# Patient Record
Sex: MALE | NOT HISPANIC OR LATINO | Employment: UNEMPLOYED | URBAN - NONMETROPOLITAN AREA
[De-identification: names, ages, dates, MRNs, and addresses within clinical notes are randomized per-mention and may not be internally consistent; named-entity substitution may affect disease eponyms.]

---

## 2023-09-30 ENCOUNTER — HOSPITAL ENCOUNTER (INPATIENT)
Facility: HOSPITAL | Age: 23
LOS: 6 days | Discharge: HOME | DRG: 897 | End: 2023-10-06
Attending: EMERGENCY MEDICINE | Admitting: PSYCHIATRY & NEUROLOGY
Payer: MEDICAID

## 2023-09-30 DIAGNOSIS — F10.920 ALCOHOLIC INTOXICATION WITHOUT COMPLICATION: ICD-10-CM

## 2023-09-30 DIAGNOSIS — Z00.8 MEDICAL CLEARANCE FOR PSYCHIATRIC ADMISSION: Primary | ICD-10-CM

## 2023-09-30 DIAGNOSIS — F10.220 ALCOHOL DEPENDENCE WITH UNCOMPLICATED INTOXICATION: ICD-10-CM

## 2023-09-30 LAB
ALBUMIN SERPL BCP-MCNC: 4.7 G/DL (ref 3.5–5.2)
ALP SERPL-CCNC: 80 U/L (ref 55–135)
ALT SERPL W/O P-5'-P-CCNC: 41 U/L (ref 10–44)
AMPHET+METHAMPHET UR QL: NEGATIVE
ANION GAP SERPL CALC-SCNC: 5 MMOL/L (ref 3–11)
APAP SERPL-MCNC: <2 UG/ML (ref 10–20)
AST SERPL-CCNC: 75 U/L (ref 10–40)
BARBITURATES UR QL SCN>200 NG/ML: NEGATIVE
BASOPHILS # BLD AUTO: 0.05 K/UL (ref 0–0.2)
BASOPHILS NFR BLD: 0.7 % (ref 0–1.9)
BENZODIAZ UR QL SCN>200 NG/ML: NEGATIVE
BILIRUB SERPL-MCNC: 0.7 MG/DL (ref 0.1–1)
BUN SERPL-MCNC: 7 MG/DL (ref 6–20)
BZE UR QL SCN: NEGATIVE
CALCIUM SERPL-MCNC: 8.6 MG/DL (ref 8.7–10.5)
CANNABINOIDS UR QL SCN: NEGATIVE
CHLORIDE SERPL-SCNC: 112 MMOL/L (ref 95–110)
CO2 SERPL-SCNC: 28 MMOL/L (ref 23–29)
CREAT SERPL-MCNC: 0.9 MG/DL (ref 0.5–1.4)
CREAT UR-MCNC: 33.8 MG/DL (ref 23–375)
DIFFERENTIAL METHOD: NORMAL
EOSINOPHIL # BLD AUTO: 0.2 K/UL (ref 0–0.5)
EOSINOPHIL NFR BLD: 2.1 % (ref 0–8)
ERYTHROCYTE [DISTWIDTH] IN BLOOD BY AUTOMATED COUNT: 12 % (ref 11.5–14.5)
EST. GFR  (NO RACE VARIABLE): >60 ML/MIN/1.73 M^2
ETHANOL SERPL-MCNC: 177 MG/DL
ETHANOL SERPL-MCNC: 267 MG/DL
ETHANOL SERPL-MCNC: 343 MG/DL
GLUCOSE SERPL-MCNC: 130 MG/DL (ref 70–110)
HCT VFR BLD AUTO: 46.6 % (ref 40–54)
HGB BLD-MCNC: 16 G/DL (ref 14–18)
IMM GRANULOCYTES # BLD AUTO: 0.02 K/UL (ref 0–0.04)
IMM GRANULOCYTES NFR BLD AUTO: 0.3 % (ref 0–0.5)
LYMPHOCYTES # BLD AUTO: 2 K/UL (ref 1–4.8)
LYMPHOCYTES NFR BLD: 27.7 % (ref 18–48)
MAGNESIUM SERPL-MCNC: 2.3 MG/DL (ref 1.6–2.6)
MCH RBC QN AUTO: 30.6 PG (ref 27–31)
MCHC RBC AUTO-ENTMCNC: 34.3 G/DL (ref 32–36)
MCV RBC AUTO: 89 FL (ref 82–98)
METHADONE UR QL SCN>300 NG/ML: NEGATIVE
MONOCYTES # BLD AUTO: 0.7 K/UL (ref 0.3–1)
MONOCYTES NFR BLD: 9.5 % (ref 4–15)
NEUTROPHILS # BLD AUTO: 4.2 K/UL (ref 1.8–7.7)
NEUTROPHILS NFR BLD: 59.7 % (ref 38–73)
NRBC BLD-RTO: 0 /100 WBC
OPIATES UR QL SCN: NEGATIVE
PCP UR QL SCN>25 NG/ML: NEGATIVE
PLATELET # BLD AUTO: 242 K/UL (ref 150–450)
PMV BLD AUTO: 9.2 FL (ref 9.2–12.9)
POTASSIUM SERPL-SCNC: 3.8 MMOL/L (ref 3.5–5.1)
PROT SERPL-MCNC: 8.3 G/DL (ref 6–8.4)
RBC # BLD AUTO: 5.23 M/UL (ref 4.6–6.2)
SALICYLATES SERPL-MCNC: <1.7 MG/DL (ref 15–30)
SODIUM SERPL-SCNC: 145 MMOL/L (ref 136–145)
TOXICOLOGY INFORMATION: NORMAL
TSH SERPL DL<=0.005 MIU/L-ACNC: 1.42 UIU/ML (ref 0.4–4)
WBC # BLD AUTO: 7.05 K/UL (ref 3.9–12.7)

## 2023-09-30 PROCEDURE — 80143 DRUG ASSAY ACETAMINOPHEN: CPT | Performed by: EMERGENCY MEDICINE

## 2023-09-30 PROCEDURE — 99285 EMERGENCY DEPT VISIT HI MDM: CPT

## 2023-09-30 PROCEDURE — 84443 ASSAY THYROID STIM HORMONE: CPT | Performed by: EMERGENCY MEDICINE

## 2023-09-30 PROCEDURE — 25000003 PHARM REV CODE 250: Performed by: EMERGENCY MEDICINE

## 2023-09-30 PROCEDURE — 80053 COMPREHEN METABOLIC PANEL: CPT | Performed by: EMERGENCY MEDICINE

## 2023-09-30 PROCEDURE — 11400000 HC PSYCH PRIVATE ROOM

## 2023-09-30 PROCEDURE — 80179 DRUG ASSAY SALICYLATE: CPT | Performed by: EMERGENCY MEDICINE

## 2023-09-30 PROCEDURE — 25000003 PHARM REV CODE 250: Performed by: PSYCHIATRY & NEUROLOGY

## 2023-09-30 PROCEDURE — 36415 COLL VENOUS BLD VENIPUNCTURE: CPT | Performed by: EMERGENCY MEDICINE

## 2023-09-30 PROCEDURE — 83735 ASSAY OF MAGNESIUM: CPT | Performed by: EMERGENCY MEDICINE

## 2023-09-30 PROCEDURE — 80307 DRUG TEST PRSMV CHEM ANLYZR: CPT | Performed by: EMERGENCY MEDICINE

## 2023-09-30 PROCEDURE — 82077 ASSAY SPEC XCP UR&BREATH IA: CPT | Mod: 91 | Performed by: EMERGENCY MEDICINE

## 2023-09-30 PROCEDURE — 85025 COMPLETE CBC W/AUTO DIFF WBC: CPT | Performed by: EMERGENCY MEDICINE

## 2023-09-30 RX ORDER — HYDROXYZINE PAMOATE 50 MG/1
50 CAPSULE ORAL EVERY 6 HOURS PRN
Status: DISCONTINUED | OUTPATIENT
Start: 2023-09-30 | End: 2023-09-30

## 2023-09-30 RX ORDER — IBUPROFEN 200 MG
1 TABLET ORAL DAILY PRN
Status: DISCONTINUED | OUTPATIENT
Start: 2023-09-30 | End: 2023-10-06 | Stop reason: HOSPADM

## 2023-09-30 RX ORDER — ACETAMINOPHEN 325 MG/1
650 TABLET ORAL EVERY 6 HOURS PRN
Status: DISCONTINUED | OUTPATIENT
Start: 2023-09-30 | End: 2023-10-06 | Stop reason: HOSPADM

## 2023-09-30 RX ORDER — OLANZAPINE 10 MG/2ML
10 INJECTION, POWDER, FOR SOLUTION INTRAMUSCULAR EVERY 8 HOURS PRN
Status: DISCONTINUED | OUTPATIENT
Start: 2023-09-30 | End: 2023-10-06 | Stop reason: HOSPADM

## 2023-09-30 RX ORDER — MAG HYDROX/ALUMINUM HYD/SIMETH 200-200-20
30 SUSPENSION, ORAL (FINAL DOSE FORM) ORAL EVERY 6 HOURS PRN
Status: DISCONTINUED | OUTPATIENT
Start: 2023-09-30 | End: 2023-10-06 | Stop reason: HOSPADM

## 2023-09-30 RX ORDER — HYDROXYZINE PAMOATE 50 MG/1
50 CAPSULE ORAL EVERY 6 HOURS PRN
Status: DISCONTINUED | OUTPATIENT
Start: 2023-09-30 | End: 2023-10-06 | Stop reason: HOSPADM

## 2023-09-30 RX ORDER — LOPERAMIDE HYDROCHLORIDE 2 MG/1
2 CAPSULE ORAL 4 TIMES DAILY PRN
Status: DISCONTINUED | OUTPATIENT
Start: 2023-09-30 | End: 2023-10-06 | Stop reason: HOSPADM

## 2023-09-30 RX ORDER — TALC
6 POWDER (GRAM) TOPICAL NIGHTLY PRN
Status: DISCONTINUED | OUTPATIENT
Start: 2023-09-30 | End: 2023-10-06 | Stop reason: HOSPADM

## 2023-09-30 RX ORDER — IBUPROFEN 600 MG/1
600 TABLET ORAL
Status: COMPLETED | OUTPATIENT
Start: 2023-09-30 | End: 2023-09-30

## 2023-09-30 RX ORDER — DOCUSATE SODIUM 100 MG/1
100 CAPSULE, LIQUID FILLED ORAL DAILY PRN
Status: DISCONTINUED | OUTPATIENT
Start: 2023-09-30 | End: 2023-10-06 | Stop reason: HOSPADM

## 2023-09-30 RX ORDER — SODIUM CHLORIDE 0.9 % (FLUSH) 0.9 %
10 SYRINGE (ML) INJECTION
Status: DISCONTINUED | OUTPATIENT
Start: 2023-09-30 | End: 2023-10-06 | Stop reason: HOSPADM

## 2023-09-30 RX ORDER — OLANZAPINE 10 MG/1
10 TABLET ORAL EVERY 8 HOURS PRN
Status: DISCONTINUED | OUTPATIENT
Start: 2023-09-30 | End: 2023-10-06 | Stop reason: HOSPADM

## 2023-09-30 RX ADMIN — Medication 6 MG: at 08:09

## 2023-09-30 RX ADMIN — HYDROXYZINE PAMOATE 50 MG: 50 CAPSULE ORAL at 08:09

## 2023-09-30 RX ADMIN — IBUPROFEN 600 MG: 600 TABLET ORAL at 04:09

## 2023-09-30 NOTE — PLAN OF CARE
Heron Pop admitted to [facility] under the care of Raffi Cheng MD with diagnosis of substance abuse. [facility] Patient is a 21yo male.    Patient calm, cooperative, quiet, and anxious. Denies Suicidal Ideation, Homicidal Ideation, Auditory Hallucinations, Visual Hallucinations, Tactile Hallucinations, Gustatory Hallucinations, and Delusions. Patient has a history of bipolar and schizophrenia and has not been medication compliant with his psych medications. Patient reports he is an alcoholic and want to go to REHAB. Patient states he drinks 14 to 18 beers a day. Patient was involved in a altercation at his Mother's house and the police were called. [facility] transported the patient to [facility] ED. Patient's ETOH was 343 upon arrival and came down to 177 before coming up to the U. The patient's UDS was negative. Patient is cooperative, but is a little loud at times.      Patient assessed and oriented to room unit and routine. Patient denies pain or discomfort at present and remains injury-free.     BLAKE COLE RN

## 2023-09-30 NOTE — ED PROVIDER NOTES
"Encounter Date: 9/30/2023       History     Chief Complaint   Patient presents with    Alcohol Problem     Patient brought in by [facility]. Patient states "I just want rehab" denies SI/HI     Patient with history of bipolar schizophrenia presents via police after a domestic argument with his mother requesting that he be brought to the emergency department.  The patient admits he is not been taking his medication as want any inpatient help.  He is tearful but not being argumentative at this time.  He also states that he has been drinking alcohol.  Denies any suicidal or homicidal ideation.      Review of patient's allergies indicates:  No Known Allergies  Past Medical History:   Diagnosis Date    ADHD     Bipolar disorder, unspecified     Schizophrenia      History reviewed. No pertinent surgical history.  History reviewed. No pertinent family history.  Social History     Substance Use Topics    Alcohol use: Yes     Review of Systems   Psychiatric/Behavioral:  Positive for agitation, behavioral problems and sleep disturbance. The patient is nervous/anxious.    All other systems reviewed and are negative.      Physical Exam     Initial Vitals [09/30/23 0802]   BP Pulse Resp Temp SpO2   (!) 157/99 (!) 130 20 98.6 °F (37 °C) 97 %      MAP       --         Physical Exam    Nursing note and vitals reviewed.  Constitutional: He appears well-developed and well-nourished.   HENT:   Head: Normocephalic and atraumatic.   Eyes: EOM are normal. Pupils are equal, round, and reactive to light.   Neck:   Normal range of motion.  Cardiovascular:  Normal rate.           Tachycardia   Pulmonary/Chest: Breath sounds normal. No respiratory distress. He has no wheezes. He has no rales.   Abdominal: Abdomen is soft. Bowel sounds are normal. He exhibits no distension. There is no abdominal tenderness. There is no rebound.   Musculoskeletal:         General: Normal range of motion.      Cervical back: Normal range of motion.     Neurological: He " is alert and oriented to person, place, and time.   Psychiatric:   Patient is anxious, tearful         ED Course   Procedures  Labs Reviewed   ALCOHOL,MEDICAL (ETHANOL) - Abnormal; Notable for the following components:       Result Value    Alcohol, Serum 343 (*)     All other components within normal limits    Narrative:     Alcohol critical result(s) called and verbal readback obtained from   Jessa Cameron RN by [facility] 09/30/2023 08:52   COMPREHENSIVE METABOLIC PANEL - Abnormal; Notable for the following components:    Chloride 112 (*)     Glucose 130 (*)     Calcium 8.6 (*)     AST 75 (*)     All other components within normal limits   SALICYLATE LEVEL - Abnormal; Notable for the following components:    Salicylate Lvl <1.7 (*)     All other components within normal limits   ACETAMINOPHEN LEVEL - Abnormal; Notable for the following components:    Acetaminophen (Tylenol), Serum <2.0 (*)     All other components within normal limits   ALCOHOL,MEDICAL (ETHANOL) - Abnormal; Notable for the following components:    Alcohol, Serum 267 (*)     All other components within normal limits   CBC W/ AUTO DIFFERENTIAL   MAGNESIUM   TSH   DRUG SCREEN PANEL, URINE EMERGENCY    Narrative:     Specimen Source->Urine          Imaging Results    None          Medications   sodium chloride 0.9% flush 10 mL (has no administration in time range)   melatonin tablet 6 mg (has no administration in time range)     Medical Decision Making  Amount and/or Complexity of Data Reviewed  Labs: ordered. Decision-making details documented in ED Course.    Risk  OTC drugs.  Prescription drug management.  Decision regarding hospitalization.               ED Course as of 09/30/23 1350   Sat Sep 30, 2023   1317 Alcohol, Serum(!): 267 [DL]   1346 Alcohol, Serum(!): 267 [DL]      ED Course User Index  [DL] Manuel Taylor MD               Medical Decision Making:   Initial Assessment:   Patient resting comfortably appears intoxicated  Differential  Diagnosis:   Medical noncompliance, schizophrenia, bipolar, alcohol abuse, suicidal ideation, homicidal ideation  ED Management:  Nursing staff discussed case with patient's mother.  Patient is supposed to be on multiple medications for his schizophrenia and bipolar has not taken it for many months but the mother is unsure of the medication he is supposed to be on.  She does not think he has been taking any drugs other than alcohol.  He is become combative at times the house however he is not shown any aggression for concerns of homicidal ideation.    Patient has elevated alcohol level will have to wait for transfer otherwise patient medically cleared for transfer.    Time 1:50 p.m.   Patient alcohol level trending down.  Discussed case with Dr. Cheng who will accept the patient once alcohol serum levels were less than 100.      Clinical Impression:   Final diagnoses:  [Z00.8] Medical clearance for psychiatric admission (Primary)  [F10.920] Alcoholic intoxication without complication        ED Disposition Condition    Admit Stable                Manuel Taylor MD  09/30/23 0457       Manuel Taylor MD  09/30/23 9847

## 2023-10-01 PROBLEM — F10.20 ALCOHOL DEPENDENCE: Status: ACTIVE | Noted: 2023-10-01

## 2023-10-01 PROBLEM — R73.03 PREDIABETES: Status: ACTIVE | Noted: 2023-10-01

## 2023-10-01 PROCEDURE — 99223 1ST HOSP IP/OBS HIGH 75: CPT | Mod: ,,, | Performed by: PSYCHIATRY & NEUROLOGY

## 2023-10-01 PROCEDURE — 99222 PR INITIAL HOSPITAL CARE,LEVL II: ICD-10-PCS | Mod: ,,, | Performed by: STUDENT IN AN ORGANIZED HEALTH CARE EDUCATION/TRAINING PROGRAM

## 2023-10-01 PROCEDURE — 99223 PR INITIAL HOSPITAL CARE,LEVL III: ICD-10-PCS | Mod: ,,, | Performed by: PSYCHIATRY & NEUROLOGY

## 2023-10-01 PROCEDURE — 25000003 PHARM REV CODE 250: Performed by: PSYCHIATRY & NEUROLOGY

## 2023-10-01 PROCEDURE — 25000003 PHARM REV CODE 250: Performed by: EMERGENCY MEDICINE

## 2023-10-01 PROCEDURE — 11400000 HC PSYCH PRIVATE ROOM

## 2023-10-01 PROCEDURE — 99222 1ST HOSP IP/OBS MODERATE 55: CPT | Mod: ,,, | Performed by: STUDENT IN AN ORGANIZED HEALTH CARE EDUCATION/TRAINING PROGRAM

## 2023-10-01 RX ORDER — LORAZEPAM 1 MG/1
1 TABLET ORAL 4 TIMES DAILY
Status: DISCONTINUED | OUTPATIENT
Start: 2023-10-01 | End: 2023-10-03

## 2023-10-01 RX ADMIN — LORAZEPAM 1 MG: 1 TABLET ORAL at 05:10

## 2023-10-01 RX ADMIN — LORAZEPAM 1 MG: 1 TABLET ORAL at 01:10

## 2023-10-01 RX ADMIN — Medication 6 MG: at 08:10

## 2023-10-01 RX ADMIN — LORAZEPAM 1 MG: 1 TABLET ORAL at 08:10

## 2023-10-01 RX ADMIN — HYDROXYZINE PAMOATE 50 MG: 50 CAPSULE ORAL at 08:10

## 2023-10-01 NOTE — NURSING
POC reviewed this shift and is ongoing.  Pt calm and cooperative on unit, no scheduled medications ordered.  Pt requested medication to help him sleep, given PRN melatonin and vistaril.  Pt also c/o headache.  Pt declined PRN tylenol and stated that he would try to sleep and notify staff if additional PRN medications are needed.  Pt out in dining area most of the evening, interacting appropriately with staff and peers.

## 2023-10-01 NOTE — H&P
History & Physical      SUBJECTIVE:     History of Present Illness:  Patient is a 22 y.o. male presents with alcohol abuse. Admitted to [facility].    No past medical history other than psych. No complaints today.  Patient endorses he would like to go to facility to detox.  No medications prior to admission.       Review of patient's allergies indicates:  No Known Allergies    Past Medical History:   Diagnosis Date    ADHD     Bipolar disorder, unspecified     Schizophrenia      History reviewed. No pertinent surgical history.  History reviewed. No pertinent family history.  Social History     Tobacco Use    Smoking status: Never    Smokeless tobacco: Never   Substance Use Topics    Alcohol use: Yes        Review of Systems:  Constitutional: no fever or chills  Respiratory: no cough or shorness of breath  Cardiovascular: no chest pain or palpitations  Gastrointestinal: no nausea or vomiting, no abdominal pain or change in bowel habits  Musculoskeletal: no arthralgias or myalgias    OBJECTIVE:     Vital Signs (Most Recent)  Temp: 98.1 °F (36.7 °C) (10/01/23 0736)  Pulse: 77 (10/01/23 0736)  Resp: 20 (10/01/23 0736)  BP: 121/79 (10/01/23 0736)  SpO2: 99 % (10/01/23 0736)    Physical Exam:  General: well developed, well nourished  Lungs:  clear to auscultation bilaterally and normal respiratory effort  Cardiovascular: Heart: regular rate and rhythm, S1, S2 normal, no murmur, click, rub or gallop. Chest Wall: no tenderness. Extremities: no cyanosis or edema, or clubbing. Pulses: 2+ and symmetric.  Abdomen/Rectal: Abdomen: soft, non-tender non-distented; bowel sounds normal; no masses,  no organomegaly. Rectal: not examined  Skin: Skin color, texture, turgor normal. No rashes or lesions  Musculoskeletal:normal gait  Psych:   Neuro: Cranial nerves:  CN II Visual fields full to confrontation.   CN III, IV, VI Pupils are equal, round, and reactive to light.  CN III: no palsy  Nystagmus: none   Diplopia: none  Ophthalmoparesis:  "none  CN V Facial sensation intact.   CN VII Facial expression full, symmetric.   CN VIII normal.   CN IX normal.   CN X normal.   CN XI normal.   CN XII normal.      Laboratory  CBC:   Recent Labs   Lab 09/30/23  0818   WBC 7.05   RBC 5.23   HGB 16.0   HCT 46.6      MCV 89   MCH 30.6   MCHC 34.3     CMP:   Recent Labs   Lab 09/30/23  0818   *   CALCIUM 8.6*   ALBUMIN 4.7   PROT 8.3      K 3.8   CO2 28   *   BUN 7   CREATININE 0.9   ALKPHOS 80   ALT 41   AST 75*   BILITOT 0.7     No results for input(s): "COLORU", "CLARITYU", "SPECGRAV", "PHUR", "PROTEINUA", "GLUCOSEU", "BILIRUBINCON", "BLOODU", "WBCU", "RBCU", "BACTERIA", "MUCUS", "NITRITE", "LEUKOCYTESUR", "UROBILINOGEN", "HYALINECASTS" in the last 168 hours.  TSH   Date Value Ref Range Status   09/30/2023 1.420 0.400 - 4.000 uIU/mL Final     Comment:     ATTENTION: The use of Biotin Supplements may interfere with this   assay.       No results found for this or any previous visit.  Alcohol, Serum   Date Value Ref Range Status   09/30/2023 177 (H) <10 mg/dL Final     Acetaminophen (Tylenol), Serum   Date Value Ref Range Status   09/30/2023 <2.0 (L) 10.0 - 20.0 ug/mL Final     Comment:     Toxic Levels:  Adults (4 hr post-ingestion).........>150 ug/mL  Adults (12 hr post-ingestion)........>40 ug/mL  Peds (2 hr post-ingestion, liquid)...>225 ug/mL       Results for orders placed or performed during the hospital encounter of 09/30/23   Salicylate Level   Result Value Ref Range    Salicylate Lvl <1.7 (L) 15.0 - 30.0 mg/dL     Results for orders placed or performed during the hospital encounter of 09/30/23   Drug screen panel, emergency   Result Value Ref Range    Benzodiazepines Negative Negative    Methadone metabolites Negative Negative    Cocaine (Metab.) Negative Negative    Opiate Scrn, Ur Negative Negative    Barbiturate Screen, Ur Negative Negative    Amphetamine Screen, Ur Negative Negative    THC Negative Negative    Phencyclidine " "Negative Negative    Creatinine, Urine 33.8 23.0 - 375.0 mg/dL    Toxicology Information SEE COMMENT      No results found for: "PREGTESTUR"    ASSESSMENT/PLAN:     Active Hospital Problems    Diagnosis  POA    Alcohol dependence [F10.20]  Unknown    Prediabetes [R73.03]  Unknown      Resolved Hospital Problems   No resolved problems to display.     Prediabetes  Endorses in the past, has been told he has elevated sugars. Unable to recall frequency changes in urination and or excessive thirst at this time. At time of admission, no significant electrolyte derangements, serum glucose level 130.   - follow up HgA1C  - follow up PCP outpatient for further evaluation    Alcohol dependence  Patient awaiting placement for detox.   Mild LFT elevation at time of admission. No visible tremors, psychomotor agitation.   - f/u psychiatry withdrawal precautions    Plan: Further orders for psych   "

## 2023-10-01 NOTE — H&P
"[facility]                                                                       Initial Psychiatric Evaluation      10/1/2023 9:27 AM   Heron Pop Initial Psychiatric Evaluation      10/1/2023 9:27 AM   Heron Pop   2000   [id number]         Date of Admission: 9/30/2023  8:00 AM    Current Legal Status: PEC    Chief Complaint: "I just want rehab"     SUBJECTIVE:     Per ER Note:  Chief Complaint   Patient presents with    Alcohol Problem       Patient brought in by [facility]. Patient states "I just want rehab" denies SI/HI      Patient with history of bipolar schizophrenia presents via police after a domestic argument with his mother requesting that he be brought to the emergency department.  The patient admits he is not been taking his medication as want any inpatient help.  He is tearful but not being argumentative at this time.  He also states that he has been drinking alcohol.  Denies any suicidal or homicidal ideation.     Per Initial Interview:  Heron Pop is a 22 y.o. male with past psychiatric history of EtOH abuse.     Pt reports that "I been drinking you know, drinking a little beer you know. Im gonna try to quit drinking and get on my medicine." States that he normally takes clonidine "to put me to sleep." He states he thinks he used to take medicine for depression and anxiety but states he cant remember. States he used to take medication for ADHD, as well as depakote. Does not want to take depakote anymore because it makes him swell. States he has been diagnosed as "ADHD, bipolar, and autism." In the past, but he does not think he still has these issue because he is now 22 years old. Pt states he is bored here because he misses his youtube, playstation and wifi.     Pt reports he drinks around 12 beers daily since he turned 19. He is not able to state whether he has had alcohol withdrawal symptoms in the past. He is interested in going to rehab.     Pt currently denies depression " "and anxiety. Denies MARI and AVH. "The doctor told me I got schizophrenic or something but I don't know because I don't see things or hear things."      Psych ROS:   Denies any consistent depression symptoms over the past 2 weeks including decreased interest/motivation/pleasure/energy/appetite/concentration/sleep.    Denies any history of manic symptoms, including decreased need for sleep, increased energy, increased goal oriented behavior, risky behavior, racing thoughts.     Denies any history of psychotic symptoms, including AVH, paranoia, thought insertion/broadcasting/withdrawal, delusions.     Denies TASH symptoms including excess worry, tension, insomnia. Denies panic attacks.     Denies history of PTSD symptoms including flashbacks, nightmares, hypervigilance.    Endorses OCD and denies eating disorder symptoms.        Collateral: Pending    ROS    Past Psychiatric History:   Previous Psychiatric Hospitalizations: YES: "I think I did before, I dont know cause I cant remember. Long time ago when I was a kid."      Previous Medication Trials: YES: depakote, others unknown     History of psychotherapy:  NO  Previous Suicide Attempts: NO  History of Violence:  NO  History of physical/sexual abuse: NO  Outpatient psychiatrist (current & past): NO    Substance Abuse History:   Tobacco: NO  Alcohol: YES: 12 beers daily x4 years     Illicit Substances: NO  Detox/Rehab: NO    Neurological History:   Seizures: YES: "long time ago when I was 14 years old. They said it was from the energy drink."      Head trauma: NO    Family Psychiatric History: Yes - Grandmother with "different personality and schizophrenic."     Social History:  Developmental/Childhood:Delayed in achieving developmental milestone  *Education: dropped out of school in 5th grade  Employment Status/Finances:Unemployed   Relationship Status/Sexual Orientation: Single:    Children: 0  Housing Status:  lives with mom and dad     history:  NO  Access " to gun: NO  Gnosticism:Spiritual without formal affiliation  Recreational activities:Music/CT    Legal History:   Past Charges/Incarcerations:  No      Past Medical/Surgical History:   Past Medical History:   Diagnosis Date    ADHD     Bipolar disorder, unspecified     Schizophrenia      History reviewed. No pertinent surgical history.      Current Medications:   MEDICATIONS: See list below      Allergies:   Review of patient's allergies indicates:  No Known Allergies      OBJECTIVE:       Vitals   Vitals:    10/01/23 0736   BP: 121/79   Pulse: 77   Resp: 20   Temp: 98.1 °F (36.7 °C)        Labs/Imaging/Studies:   Recent Results (from the past 48 hour(s))   Ethanol    Collection Time: 09/30/23  8:18 AM   Result Value Ref Range    Alcohol, Serum 343 (HH) <10 mg/dL   CBC auto differential    Collection Time: 09/30/23  8:18 AM   Result Value Ref Range    WBC 7.05 3.90 - 12.70 K/uL    RBC 5.23 4.60 - 6.20 M/uL    Hemoglobin 16.0 14.0 - 18.0 g/dL    Hematocrit 46.6 40.0 - 54.0 %    MCV 89 82 - 98 fL    MCH 30.6 27.0 - 31.0 pg    MCHC 34.3 32.0 - 36.0 g/dL    RDW 12.0 11.5 - 14.5 %    Platelets 242 150 - 450 K/uL    MPV 9.2 9.2 - 12.9 fL    Immature Granulocytes 0.3 0.0 - 0.5 %    Gran # (ANC) 4.2 1.8 - 7.7 K/uL    Immature Grans (Abs) 0.02 0.00 - 0.04 K/uL    Lymph # 2.0 1.0 - 4.8 K/uL    Mono # 0.7 0.3 - 1.0 K/uL    Eos # 0.2 0.0 - 0.5 K/uL    Baso # 0.05 0.00 - 0.20 K/uL    nRBC 0 0 /100 WBC    Gran % 59.7 38.0 - 73.0 %    Lymph % 27.7 18.0 - 48.0 %    Mono % 9.5 4.0 - 15.0 %    Eosinophil % 2.1 0.0 - 8.0 %    Basophil % 0.7 0.0 - 1.9 %    Differential Method Automated    Comprehensive metabolic panel    Collection Time: 09/30/23  8:18 AM   Result Value Ref Range    Sodium 145 136 - 145 mmol/L    Potassium 3.8 3.5 - 5.1 mmol/L    Chloride 112 (H) 95 - 110 mmol/L    CO2 28 23 - 29 mmol/L    Glucose 130 (H) 70 - 110 mg/dL    BUN 7 6 - 20 mg/dL    Creatinine 0.9 0.5 - 1.4 mg/dL    Calcium 8.6 (L) 8.7 - 10.5 mg/dL    Total  "Protein 8.3 6.0 - 8.4 g/dL    Albumin 4.7 3.5 - 5.2 g/dL    Total Bilirubin 0.7 0.1 - 1.0 mg/dL    Alkaline Phosphatase 80 55 - 135 U/L    AST 75 (H) 10 - 40 U/L    ALT 41 10 - 44 U/L    eGFR >60.0 >60 mL/min/1.73 m^2    Anion Gap 5 3 - 11 mmol/L   Magnesium    Collection Time: 09/30/23  8:18 AM   Result Value Ref Range    Magnesium 2.3 1.6 - 2.6 mg/dL   TSH    Collection Time: 09/30/23  8:18 AM   Result Value Ref Range    TSH 1.420 0.400 - 4.000 uIU/mL   Salicylate Level    Collection Time: 09/30/23  8:18 AM   Result Value Ref Range    Salicylate Lvl <1.7 (L) 15.0 - 30.0 mg/dL   Acetaminophen Level    Collection Time: 09/30/23  8:18 AM   Result Value Ref Range    Acetaminophen (Tylenol), Serum <2.0 (L) 10.0 - 20.0 ug/mL   Drug screen panel, emergency    Collection Time: 09/30/23  8:36 AM   Result Value Ref Range    Benzodiazepines Negative Negative    Methadone metabolites Negative Negative    Cocaine (Metab.) Negative Negative    Opiate Scrn, Ur Negative Negative    Barbiturate Screen, Ur Negative Negative    Amphetamine Screen, Ur Negative Negative    THC Negative Negative    Phencyclidine Negative Negative    Creatinine, Urine 33.8 23.0 - 375.0 mg/dL    Toxicology Information SEE COMMENT    Ethanol    Collection Time: 09/30/23 12:17 PM   Result Value Ref Range    Alcohol, Serum 267 (H) <10 mg/dL   Ethanol    Collection Time: 09/30/23  3:54 PM   Result Value Ref Range    Alcohol, Serum 177 (H) <10 mg/dL      No results found for: "PHENYTOIN", "PHENOBARB", "VALPROATE", "CBMZ"      Musculoskeletal Exam:  Abnormal Involuntary Movements: NO  Gait: normal  Strength: Greater than antigravity (3+/5) in all extremities   Muscle tone: No impairment   Station: Grossly normal       General/Constitutional Exam:  Appearance: well-groomed, appropriate    Strengths AND Liabilities  Strength: Patient accepts guidance/feedback, Strength: Patient has positive support network., Liability: Patient is impulsive., Liability: Patient " "has intellectual deficits.    Psychiatric Mental Status Exam:  Arousal: alert  Sensorium/Orientation: oriented to grossly intact, person, place, time/date  Behavior/Cooperation: normal, cooperative   Speech: normal tone, normal rate, normal pitch, normal volume, articulation error  Language: grossly intact  Mood: " ok "   Affect: appropriate  Thought Process: goal-directed  Thought Content:   Auditory hallucinations: NO  Visual hallucinations: NO  Paranoia: NO  Delusions:  NO  Suicidal ideation: NO  Homicidal ideation: NO  Attention/Concentration:  unable to spell "WORLD" backwards  Memory:    Recent: [facility] Recent Memory: WNL , 2 out of 3 in 3 minutes  Remote: [facility] Remote Memory: WNL , past events, as relates history  Fund of Knowledge: Vocabulary appropriate    Abstract reasoning: proverbs were concrete, similarities were concrete  Intelligence: [facility] Intelligence: Below Average, based on history, based on vocabulary, syntax, grammar and content, based on history, based on vocabulary, syntax, grammar and content  Insight: {[facility] insight: Fair, understanding severity of illness/history of present illness  Judgment: [facility] Judgement: Fair, per patient's behavior/history of present illness         ASSESSMENT/PLAN:   Diagnosis:  EtOH Dependence     H/o ADHD  H/o OCD  H/o Bipolar    Elevated AST    There are no problems to display for this patient.         ASSESSMENT AND TREATMENT PLAN:    Medical decision making/Formulation:  Alcohol Dependence  - Start Ativan 1mg PO QID (Avoiding valium due to elevated AST) with plan to taper off as able  - VS q4h with Ativan PRN for breakthrough Withdrawal    H/o Bipolar Disorder  - Does not screen positive for any history of manic episodes on ROS  - Collateral will aid in clarifying clinical picture.     H/o OCD  - Unable to report medications  - Collateral will aid in clarifying home treatment regimen.     Elevated AST  - mild elevation  - Will avoid hepatotoxic medications  - Re-check " prior to DC      Pt was informed of all the side effects, alternatives, risks and benefits of taking this medicine.  Pt made an informed decision to take these medications.  Pt was able to weigh the risks and benefits and stated that the benefits out weigh the risks at this time.     - Will have pt sign AMANDA to obtain outside medical records, if possible    - Social work will obtain collateral and work towards discharge plan including follow up care.    LAB ORDERS  A1c  Lipid     ENCOURAGE MCGUIRE MILIEU    CONTINUE INPATIENT HOSPITALIZATION FOR STABILIZATION ON MEDICATION     PROGNOSIS: GUARDED    ESTIMATED LENGTH OF STAY: 4-7 DAYS      TOTAL TIME SPENT: 75 minutes     More than 50% of that time spent on chart review, formulation of healthcare plan, examination and discussion with patient and healthcare team.     Raffi Cheng MD

## 2023-10-01 NOTE — PLAN OF CARE
Problem: Violence Risk or Actual  Goal: Anger and Impulse Control  Outcome: Ongoing, Progressing     Problem: Adult Behavioral Health Plan of Care  Goal: Plan of Care Review  Outcome: Ongoing, Progressing  Goal: Patient-Specific Goal (Individualization)  Outcome: Ongoing, Progressing  Goal: Adheres to Safety Considerations for Self and Others  Outcome: Ongoing, Progressing  Goal: Absence of New-Onset Illness or Injury  Outcome: Ongoing, Progressing  Goal: Optimized Coping Skills in Response to Life Stressors  Outcome: Ongoing, Progressing     Problem: Activity and Energy Impairment (Excessive Substance Use)  Goal: Optimized Energy Level (Excessive Substance Use)  Outcome: Ongoing, Progressing     Problem: Behavior Regulation Impairment (Excessive Substance Use)  Goal: Improved Behavioral Control (Excessive Substance Use)  Outcome: Ongoing, Progressing     Problem: Decreased Participation and Engagement (Excessive Substance Use)  Goal: Increased Participation and Engagement (Excessive Substance Use)  Outcome: Ongoing, Progressing     Problem: Physiologic Impairment (Excessive Substance Use)  Goal: Improved Physiologic Symptoms (Excessive Substance Use)  Outcome: Ongoing, Progressing     Problem: Social, Occupational or Functional Impairment (Excessive Substance Use)  Goal: Enhanced Social, Occupational or Functional Skills (Excessive Substance Use)  Outcome: Ongoing, Progressing     Problem: Activity and Energy Impairment (Anxiety Signs/Symptoms)  Goal: Optimized Energy Level (Anxiety Signs/Symptoms)  Outcome: Ongoing, Progressing     Problem: Cognitive Impairment (Anxiety Signs/Symptoms)  Goal: Optimized Cognitive Function (Anxiety Signs/Symptoms)  Outcome: Ongoing, Progressing     Problem: Mood Impairment (Anxiety Signs/Symptoms)  Goal: Improved Mood Symptoms (Anxiety Signs/Symptoms)  Outcome: Ongoing, Progressing     Problem: Sleep Impairment (Anxiety Signs/Symptoms)  Goal: Improved Sleep (Anxiety  Signs/Symptoms)  Outcome: Ongoing, Progressing     Problem: Social, Occupational or Functional Impairment (Anxiety Signs/Symptoms)  Goal: Enhanced Social, Occupational or Functional Skills (Anxiety Signs/Symptoms)  Outcome: Ongoing, Progressing     Problem: Somatic Disturbance (Anxiety Signs/Symptoms)  Goal: Improved Somatic Symptoms (Anxiety Signs/Symptoms)  Outcome: Ongoing, Progressing     Problem: Alcohol Withdrawal  Goal: Alcohol Withdrawal Symptom Control  Outcome: Ongoing, Progressing     Problem: Acute Neurologic Deterioration (Alcohol Withdrawal)  Goal: Optimal Neurologic Function  Outcome: Ongoing, Progressing     Problem: Substance Misuse (Alcohol Withdrawal)  Goal: Readiness for Change Identified  Outcome: Ongoing, Progressing

## 2023-10-01 NOTE — ASSESSMENT & PLAN NOTE
Endorses in the past, has been told he has elevated sugars. Unable to recall frequency changes in urination and or excessive thirst at this time. At time of admission, no significant electrolyte derangements, serum glucose level 130.   - follow up HgA1C  - follow up PCP outpatient for further evaluation

## 2023-10-01 NOTE — PLAN OF CARE
POC reviewed this shift and is on going. Patient is calm, cooperative, and anxious. Denies Suicidal Ideation, Homicidal Ideation, Auditory Hallucinations, Visual Hallucinations, Tactile Hallucinations, Gustatory Hallucinations, and Delusions. Patient slept most of the morning. Patient then was out on the floor communicating with his peers and watching TV. Patient was started on a alcohol protocol 1mg Ativan QID. Pt continues to be medication compliant and staff will continue to monitor pt closely while on the unit.

## 2023-10-01 NOTE — ASSESSMENT & PLAN NOTE
Patient awaiting placement for detox.   Mild LFT elevation at time of admission. No visible tremors, psychomotor agitation.   - f/u psychiatry withdrawal precautions

## 2023-10-02 PROCEDURE — 25000003 PHARM REV CODE 250: Performed by: EMERGENCY MEDICINE

## 2023-10-02 PROCEDURE — 90833 PR PSYCHOTHERAPY W/PATIENT W/E&M, 30 MIN (ADD ON): ICD-10-PCS | Mod: ,,, | Performed by: PSYCHIATRY & NEUROLOGY

## 2023-10-02 PROCEDURE — 90833 PSYTX W PT W E/M 30 MIN: CPT | Mod: ,,, | Performed by: PSYCHIATRY & NEUROLOGY

## 2023-10-02 PROCEDURE — 25000003 PHARM REV CODE 250: Performed by: PSYCHIATRY & NEUROLOGY

## 2023-10-02 PROCEDURE — 99232 SBSQ HOSP IP/OBS MODERATE 35: CPT | Mod: ,,, | Performed by: PSYCHIATRY & NEUROLOGY

## 2023-10-02 PROCEDURE — 11400000 HC PSYCH PRIVATE ROOM

## 2023-10-02 PROCEDURE — 99232 PR SUBSEQUENT HOSPITAL CARE,LEVL II: ICD-10-PCS | Mod: ,,, | Performed by: PSYCHIATRY & NEUROLOGY

## 2023-10-02 RX ORDER — NALTREXONE HYDROCHLORIDE 50 MG/1
50 TABLET, FILM COATED ORAL DAILY
Status: DISCONTINUED | OUTPATIENT
Start: 2023-10-02 | End: 2023-10-03

## 2023-10-02 RX ADMIN — LORAZEPAM 1 MG: 1 TABLET ORAL at 12:10

## 2023-10-02 RX ADMIN — Medication 6 MG: at 08:10

## 2023-10-02 RX ADMIN — HYDROXYZINE PAMOATE 50 MG: 50 CAPSULE ORAL at 08:10

## 2023-10-02 RX ADMIN — LORAZEPAM 1 MG: 1 TABLET ORAL at 08:10

## 2023-10-02 RX ADMIN — NALTREXONE HYDROCHLORIDE 50 MG: 50 TABLET, FILM COATED ORAL at 12:10

## 2023-10-02 RX ADMIN — LORAZEPAM 1 MG: 1 TABLET ORAL at 04:10

## 2023-10-02 NOTE — PLAN OF CARE
POC reviewed and is ongoing. Pt is calm, cooperative and complaint on the unit. Pt interacts well with peers and staff. Medication compliant. Pt attended group therapy and has been out of his room a majority of the day, watching television and talking with peers. Pt denies SI/HI/AVH. No s/s of distress. Will continue to monitor pt while on unit to ensure pt's health and safety.

## 2023-10-02 NOTE — PLAN OF CARE
POC reviewed this shift and is ongoing.  Pt cooperative with staff and limited interaction with peers. Pt in his room sleep most of the shift. Compliant with meds. No adverse reaction or ss of distress. Pt able to make needs known. Will continue to monitor for changes.

## 2023-10-02 NOTE — SUBJECTIVE & OBJECTIVE
Past Medical History:   Diagnosis Date    ADHD     Bipolar disorder, unspecified     Schizophrenia        History reviewed. No pertinent surgical history.    Review of patient's allergies indicates:  No Known Allergies    No current facility-administered medications on file prior to encounter.     No current outpatient medications on file prior to encounter.     Family History    None       Tobacco Use    Smoking status: Never    Smokeless tobacco: Never   Substance and Sexual Activity    Alcohol use: Yes    Drug use: Not on file    Sexual activity: Not on file     Review of Systems   Constitutional:  Negative for fatigue and fever.   HENT:  Negative for congestion, ear pain and sore throat.    Eyes:  Negative for pain and discharge.   Respiratory:  Negative for cough, shortness of breath and wheezing.    Gastrointestinal:  Negative for abdominal pain, constipation, diarrhea, nausea and vomiting.   Endocrine: Negative for cold intolerance and heat intolerance.   Genitourinary:  Negative for difficulty urinating, dysuria and frequency.   Musculoskeletal:  Negative for arthralgias.   Allergic/Immunologic: Negative for environmental allergies.   Neurological:  Negative for dizziness, tremors and seizures.   Psychiatric/Behavioral:  Positive for agitation, behavioral problems, dysphoric mood and sleep disturbance. The patient is nervous/anxious.    All other systems reviewed and are negative.    Objective:     Vital Signs (Most Recent):  Temp: 98.2 °F (36.8 °C) (10/02/23 0758)  Pulse: 107 (10/02/23 0758)  Resp: 18 (10/02/23 0758)  BP: (!) 143/86 (10/02/23 0758)  SpO2: 100 % (10/02/23 0758) Vital Signs (24h Range):  Temp:  [98.2 °F (36.8 °C)-98.6 °F (37 °C)] 98.2 °F (36.8 °C)  Pulse:  [] 107  Resp:  [16-18] 18  SpO2:  [100 %] 100 %  BP: (120-143)/(75-86) 143/86     Weight: 64.4 kg (142 lb)  Body mass index is 24.37 kg/m².     Physical Exam  Vitals and nursing note reviewed.   Constitutional:       Appearance:  Normal appearance.   HENT:      Head: Normocephalic and atraumatic.      Nose: Nose normal.      Mouth/Throat:      Mouth: Mucous membranes are moist.      Pharynx: Oropharynx is clear.   Eyes:      Extraocular Movements: Extraocular movements intact.      Conjunctiva/sclera: Conjunctivae normal.      Pupils: Pupils are equal, round, and reactive to light.   Cardiovascular:      Rate and Rhythm: Normal rate and regular rhythm.      Pulses: Normal pulses.      Heart sounds: Normal heart sounds.   Pulmonary:      Effort: Pulmonary effort is normal.      Breath sounds: Normal breath sounds.   Abdominal:      General: Abdomen is flat. Bowel sounds are normal.      Palpations: Abdomen is soft.   Musculoskeletal:         General: Normal range of motion.      Cervical back: Normal range of motion and neck supple.   Skin:     General: Skin is warm and dry.      Capillary Refill: Capillary refill takes less than 2 seconds.      Comments: No rashes on limited skin exam.   Neurological:      General: No focal deficit present.      Mental Status: He is alert and oriented to person, place, and time.      Cranial Nerves: No cranial nerve deficit.      Comments: I Olfactory:  Sense of smell intact    II Optic:  Pupils equal round react to light.  Vision intact.    III, IV, VI, Ocular motor, Trochlear, Abducens:  Extraocular movements intact    V Trigeminal:  Facial sensation intact facial sensation intact,, muscles of mastication intact muscles of mastication intact, corneal reflex intact, corneal reflex intact    VII Facial:  Muscles of facial expression intact     VIII Vestibular cochlear: Hearing intact vestibular cochlear: Hearing intact    IX Glossopharyngeal:  Gag reflex intact.  Tasting intact.     X Vagus:  Gag reflex intact.    XI Spinal Accessory:  Shoulder shrug intact.  Head rotation intact.    XII Hypoglossal:  Tongue movements intact.     Psychiatric:      Comments: Patient appears anxious              CRANIAL NERVES      CN III, IV, VI   Pupils are equal, round, and reactive to light.       Significant Labs: All pertinent labs within the past 24 hours have been reviewed.    Significant Imaging: I have reviewed all pertinent imaging results/findings within the past 24 hours.

## 2023-10-02 NOTE — PROGRESS NOTES
"PSYCHIATRY DAILY INPATIENT PROGRESS NOTE  SUBSEQUENT HOSPITAL VISIT    ENCOUNTER DATE: 10/2/2023  SITE: [facility]    DATE OF ADMISSION: 9/30/2023  8:00 AM  LENGTH OF STAY: 2 days    CHIEF COMPLAINT   Heron Pop is a 22 y.o. male, seen during daily christian rounds on the inpatient unit.  Heron Pop presented with the chief complaint of alcohol problem, "I just want rehab"         The patient was seen and examined. The chart was reviewed.     Reviewed notes from Rns and MD and labs from the last 24 hours.    The patient's case was discussed with the treatment team/care providers today including Rns and MD    Staff reports no behavioral or management issues.     The patient has been compliant with treatment.    Subjective 10/02/2023     Today the patient reports mood is "pretty good," affect is congruent. Patient is noted to be somewhat childlike in demeanor which is suspected to be due to cognition/development issues. He again states that he is in the hospital to stop drinking, adding that he has been drinking "Beers starting around noon until night time" for about five years. He is unsure at this point if he would like to enter inpatient rehab, however is interested in medication therapy for cravings.     Patient denies s/s associated with alcohol withdrawal, reports only notable symptom at this time is "Feeling a little tired."    The patient denies any side effects to medications.    Psychiatric ROS (observed, reported, or endorsed/denied):  Depressed mood - No  Interest/pleasure/anhedonia: No  Guilt/hopelessness/worthlessness - No  Changes in Sleep - No  Changes in Appetite - No  Changes in Concentration - No  Changes in Energy - No  PMA/R- No  Suicidal- active/passive ideations - No  Homicidal ideations: active/passive ideations - No    Hallucinations - No  Delusions - No  Disorganized behavior - No  Disorganized speech - No  Negative symptoms - No    Elevated mood - No  Decreased need for sleep - " No  Grandiosity - No  Racing thoughts - No  Impulsivity - No  Irritability- No  Increased energy - No  Distractibility - No  Increase in goal-directed activity or PMA- No    Symptoms of TASH - No  Symptoms of Panic Disorder- No  Symptoms of PTSD - No    Overall progress: Patient is showing mild improvement     Psychotherapy:  Target symptoms: alcohol abuse  Why chosen therapy is appropriate versus another modality: relevant to diagnosis, evidence based practice  Outcome monitoring methods: self-report, observation  Therapeutic intervention type: insight oriented psychotherapy, supportive psychotherapy  Topics discussed/themes: building skills sets for symptom management, symptom recognition, substance abuse  The patient's response to the intervention is accepting. The patient's progress toward treatment goals is fair.   Duration of intervention: 16 minutes.    Medical ROS  Review of Systems   Constitutional: Negative.    HENT: Negative.     Eyes: Negative.    Respiratory: Negative.     Cardiovascular: Negative.    Gastrointestinal: Negative.    Genitourinary: Negative.    Musculoskeletal: Negative.    Skin: Negative.    Neurological: Negative.    Endo/Heme/Allergies: Negative.    Psychiatric/Behavioral:          As noted       PAST MEDICAL HISTORY   Past Medical History:   Diagnosis Date    ADHD     Bipolar disorder, unspecified     Schizophrenia        PSYCHOTROPIC MEDICATIONS   Scheduled Meds:   LORazepam  1 mg Oral QID     Continuous Infusions:  PRN Meds:.acetaminophen, aluminum-magnesium hydroxide-simethicone, docusate sodium, hydrOXYzine pamoate, loperamide, melatonin, nicotine, OLANZapine, OLANZapine, sodium chloride 0.9%    EXAMINATION    VITALS   Vitals:    09/30/23 1919 10/01/23 0736 10/01/23 1918 10/02/23 0758   BP: (!) 147/89 121/79 120/75 (!) 143/86   BP Location: Left arm Left arm Left arm Left arm   Patient Position: Sitting Sitting Sitting Sitting   Pulse: 107 77 86 107   Resp: 16 20 16 18   Temp: 98.6  "°F (37 °C) 98.1 °F (36.7 °C) 98.6 °F (37 °C) 98.2 °F (36.8 °C)   TempSrc: Oral Oral Oral Oral   SpO2: 99% 99% 100% 100%   Weight:       Height:           Body mass index is 24.37 kg/m².    CONSTITUTIONAL  General Appearance: unremarkable, age appropriate    MUSCULOSKELETAL  Muscle Strength and Tone:no tremor, no tic  Abnormal Involuntary Movements: No  Gait and Station: non-ataxic    PSYCHIATRIC   Level of Consciousness: awake, alert , and oriented  Orientation: person, place, time, and situation  Grooming: Hospital garb  Psychomotor Behavior: normal, cooperative, friendly and cooperative  Speech: normal tone, normal rate, normal pitch, normal volume  Language: grossly intact  Mood: "Good"  Affect: Consistent with mood and Congruent with thought  Thought Process: linear, logical  Associations: intact   Thought Content: DENIES suicidal ideation, DENIES homicidal ideation, and no delusions  Perceptions: denies hallucinations  Memory: Able to recall past events, Remote intact, and Recent intact  Attention:Normal and Attends to interview without distraction  Fund of Knowledge: Aware of current events and Vocabulary appropriate   Estimate if Intelligence:  Below average based on work/education history, vocabulary and mental status exam  Insight: limited awareness of illness  Judgment: behavior is adequate to circumstances    DIAGNOSTIC TESTING   Laboratory Results  No results found for this or any previous visit (from the past 24 hour(s)).    MEDICAL DECISION MAKING        ASSESSMENT:   EtOH Dependence      H/o ADHD  H/o OCD  H/o Bipolar     Elevated AST     There are no problems to display for this patient.           ASSESSMENT AND TREATMENT PLAN:     Medical decision making/Formulation:  Alcohol Dependence  - Start Ativan 1mg PO QID (Avoiding valium due to elevated AST) with plan to taper off as able  - VS q4h with Ativan PRN for breakthrough Withdrawal-Continue current dose  -Initiate PO naltrexone and consider AZEVEDO " prior to discharge     H/o Bipolar Disorder  - Does not screen positive for any history of manic episodes on ROS  - Collateral will aid in clarifying clinical picture.      H/o OCD  - Unable to report medications  - Collateral will aid in clarifying home treatment regimen.      Elevated AST  - mild elevation  - Will avoid hepatotoxic medications  - Re-check prior to DC  Discussed diagnosis, risks and benefits of proposed treatment vs alternative treatments vs no treatment, potential side effects of these treatments and the inherent unpredictability of treatment. The patient expresses understanding of the above and displays the capacity to agree with this treatment given said understanding. Patient also agrees that, currently, the benefits outweigh the risks and would like to pursue/continue treatment at this time.    DISCHARGE PLANNING  Expected Disposition Plan: Home or Self Care    NEED FOR CONTINUED HOSPITALIZATION  Psychiatric illness continues to pose a potential threat to life or bodily function, of self or others, thereby requiring the need for continued inpatient psychiatric hospitalization: Yes, due to: gravely disabled, as evidenced by:  Ongoing concerns with grave disability with patient unable to perform basic feeding, hygiene and dressing activities without significant constant support.    Protective inpatient pyschiatric hospitalization required while a safe disposition plan is enacted: Yes    Patient stabilized and ready for discharge from inpatient psychiatric unit: No      STAFF:   Robert Carrillo NP  Psychiatry

## 2023-10-02 NOTE — HPI
"Chief Complaint   Patient presents with    Alcohol Problem       [facility] states "I just want rehab" denies SI/HI      Patient with history of bipolar schizophrenia presents via police after a domestic argument with his mother requesting that he be brought to the emergency department.  The patient admits he is not been taking his medication as want any inpatient help.  He is tearful but not being argumentative at this time.  He also states that he has been drinking alcohol.  Denies any suicidal or homicidal ideation.     "

## 2023-10-03 PROCEDURE — 90833 PSYTX W PT W E/M 30 MIN: CPT | Mod: ,,, | Performed by: PSYCHIATRY & NEUROLOGY

## 2023-10-03 PROCEDURE — 25000003 PHARM REV CODE 250: Performed by: PSYCHIATRY & NEUROLOGY

## 2023-10-03 PROCEDURE — 11400000 HC PSYCH PRIVATE ROOM

## 2023-10-03 PROCEDURE — 99232 SBSQ HOSP IP/OBS MODERATE 35: CPT | Mod: ,,, | Performed by: PSYCHIATRY & NEUROLOGY

## 2023-10-03 PROCEDURE — 63600175 PHARM REV CODE 636 W HCPCS: Mod: JZ | Performed by: PSYCHIATRY & NEUROLOGY

## 2023-10-03 PROCEDURE — 99232 PR SUBSEQUENT HOSPITAL CARE,LEVL II: ICD-10-PCS | Mod: ,,, | Performed by: PSYCHIATRY & NEUROLOGY

## 2023-10-03 PROCEDURE — 90833 PR PSYCHOTHERAPY W/PATIENT W/E&M, 30 MIN (ADD ON): ICD-10-PCS | Mod: ,,, | Performed by: PSYCHIATRY & NEUROLOGY

## 2023-10-03 RX ORDER — LORAZEPAM 1 MG/1
1 TABLET ORAL 3 TIMES DAILY
Status: DISCONTINUED | OUTPATIENT
Start: 2023-10-03 | End: 2023-10-04

## 2023-10-03 RX ADMIN — LORAZEPAM 1 MG: 1 TABLET ORAL at 02:10

## 2023-10-03 RX ADMIN — LORAZEPAM 1 MG: 1 TABLET ORAL at 08:10

## 2023-10-03 RX ADMIN — HYDROXYZINE PAMOATE 50 MG: 50 CAPSULE ORAL at 08:10

## 2023-10-03 RX ADMIN — NALTREXONE 380 MG: KIT at 09:10

## 2023-10-03 RX ADMIN — NALTREXONE HYDROCHLORIDE 50 MG: 50 TABLET, FILM COATED ORAL at 08:10

## 2023-10-03 NOTE — NURSING
POC reviewed and is ongoing.  Pt is calm and cooperative on unit and compliant with medications.  Pt denies SI/HI/AVH.  Pt remains largely isolated to self with minimal interaction with staff and peers.

## 2023-10-03 NOTE — PROGRESS NOTES
"PSYCHIATRY DAILY INPATIENT PROGRESS NOTE  SUBSEQUENT HOSPITAL VISIT    ENCOUNTER DATE: 10/3/2023  SITE: [facility]    DATE OF ADMISSION: 9/30/2023  8:00 AM  LENGTH OF STAY: 3 days    CHIEF COMPLAINT   Heron Pop is a 22 y.o. male, seen during daily christian rounds on the inpatient unit.  Heron Pop presented with the chief complaint of alcohol problem, "I just want rehab"         The patient was seen and examined. The chart was reviewed.     Reviewed notes from Rns and MD and labs from the last 24 hours.    The patient's case was discussed with the treatment team/care providers today including Rns and MD    Staff reports no behavioral or management issues.     The patient has been compliant with treatment.    Subjective 10/03/2023    Patient reports mood is "good," affect is appropriate. Continues to deny s/s of ETOH withdrawal, VS remain grossly WNL. Patient received PO Naltrexone yesterday which was tolerated well and would like to receive Vivitrol injection. Will taper ativan to TID today.     Patient remains indecisive about willingness to attend inpatient rehab after leaving the hospital, stating "I just really miss my family." Patient once more counseled regarding risk of relapse and long-term effects of prolonged alcohol consumption. He verbalized understanding.     Reports sleep last night was "great," denies SE to current medication regimen.     Psychiatric ROS (observed, reported, or endorsed/denied):  Depressed mood - No  Interest/pleasure/anhedonia: No  Guilt/hopelessness/worthlessness - No  Changes in Sleep - No  Changes in Appetite - No  Changes in Concentration - No  Changes in Energy - No  PMA/R- No  Suicidal- active/passive ideations - No  Homicidal ideations: active/passive ideations - No    Hallucinations - No  Delusions - No  Disorganized behavior - No  Disorganized speech - No  Negative symptoms - No    Elevated mood - No  Decreased need for sleep - No  Grandiosity - No  Racing thoughts " - No  Impulsivity - No  Irritability- No  Increased energy - No  Distractibility - No  Increase in goal-directed activity or PMA- No    Symptoms of TASH - No  Symptoms of Panic Disorder- No  Symptoms of PTSD - No    Overall progress: Patient is showing mild improvement     Psychotherapy:  Target symptoms: alcohol abuse  Why chosen therapy is appropriate versus another modality: relevant to diagnosis, evidence based practice  Outcome monitoring methods: self-report, observation  Therapeutic intervention type: insight oriented psychotherapy, supportive psychotherapy  Topics discussed/themes: building skills sets for symptom management, symptom recognition, substance abuse  The patient's response to the intervention is accepting. The patient's progress toward treatment goals is fair.   Duration of intervention: 16 minutes.    Medical ROS  Review of Systems   Constitutional: Negative.    HENT: Negative.     Eyes: Negative.    Respiratory: Negative.     Cardiovascular: Negative.    Gastrointestinal: Negative.    Genitourinary: Negative.    Musculoskeletal: Negative.    Skin: Negative.    Neurological: Negative.    Endo/Heme/Allergies: Negative.    Psychiatric/Behavioral:          As noted       PAST MEDICAL HISTORY   Past Medical History:   Diagnosis Date    ADHD     Bipolar disorder, unspecified     Schizophrenia        PSYCHOTROPIC MEDICATIONS   Scheduled Meds:   LORazepam  1 mg Oral TID    naltrexone  50 mg Oral Daily     Continuous Infusions:  PRN Meds:.acetaminophen, aluminum-magnesium hydroxide-simethicone, docusate sodium, hydrOXYzine pamoate, loperamide, melatonin, nicotine, OLANZapine, OLANZapine, sodium chloride 0.9%    EXAMINATION    VITALS   Vitals:    10/01/23 1918 10/02/23 0758 10/02/23 1929 10/03/23 0745   BP: 120/75 (!) 143/86 128/72 122/82   BP Location: Left arm Left arm Left arm Left arm   Patient Position: Sitting Sitting Sitting Sitting   Pulse: 86 107 96 86   Resp: 16 18 20 18   Temp: 98.6 °F (37 °C)  "98.2 °F (36.8 °C) 99.1 °F (37.3 °C) 98.6 °F (37 °C)   TempSrc: Oral Oral Oral Oral   SpO2: 100% 100% 99% 98%   Weight:       Height:           Body mass index is 24.37 kg/m².    CONSTITUTIONAL  General Appearance: unremarkable, age appropriate    MUSCULOSKELETAL  Muscle Strength and Tone:no tremor, no tic  Abnormal Involuntary Movements: No  Gait and Station: non-ataxic    PSYCHIATRIC   Level of Consciousness: awake, alert , and oriented  Orientation: person, place, time, and situation  Grooming: Hospital garb  Psychomotor Behavior: normal, cooperative, friendly and cooperative  Speech: normal tone, normal rate, normal pitch, normal volume  Language: grossly intact  Mood: "Great"  Affect: Consistent with mood and Congruent with thought  Thought Process: linear, logical  Associations: intact   Thought Content: DENIES suicidal ideation, DENIES homicidal ideation, and no delusions  Perceptions: denies hallucinations  Memory: Able to recall past events, Remote intact, and Recent intact  Attention:Normal and Attends to interview without distraction  Fund of Knowledge: Aware of current events and Vocabulary appropriate   Estimate if Intelligence:  Below average based on work/education history, vocabulary and mental status exam  Insight: limited awareness of illness  Judgment: behavior is adequate to circumstances    DIAGNOSTIC TESTING   Laboratory Results  No results found for this or any previous visit (from the past 24 hour(s)).    MEDICAL DECISION MAKING        ASSESSMENT:   EtOH Dependence      H/o ADHD  H/o OCD  H/o Bipolar     Elevated AST     There are no problems to display for this patient.           ASSESSMENT AND TREATMENT PLAN:     Medical decision making/Formulation:  Alcohol Dependence  - Start Ativan 1mg PO QID (Avoiding valium due to elevated AST) with plan to taper off as able-Decrease to TID today  - VS q4h with Ativan PRN for breakthrough Withdrawal-Continue current dose  -Initiate PO naltrexone and " consider AZEVEDO prior to discharge  -Initiate Vivitrol injection/discontinue naltrexone     H/o Bipolar Disorder  - Does not screen positive for any history of manic episodes on ROS  - Collateral will aid in clarifying clinical picture.      H/o OCD  - Unable to report medications  - Collateral will aid in clarifying home treatment regimen.      Elevated AST  - mild elevation  - Will avoid hepatotoxic medications  - Re-check tomorrow    Discussed diagnosis, risks and benefits of proposed treatment vs alternative treatments vs no treatment, potential side effects of these treatments and the inherent unpredictability of treatment. The patient expresses understanding of the above and displays the capacity to agree with this treatment given said understanding. Patient also agrees that, currently, the benefits outweigh the risks and would like to pursue/continue treatment at this time.    DISCHARGE PLANNING  Expected Disposition Plan: Home or Self Care    NEED FOR CONTINUED HOSPITALIZATION  Psychiatric illness continues to pose a potential threat to life or bodily function, of self or others, thereby requiring the need for continued inpatient psychiatric hospitalization: Yes, due to: gravely disabled, as evidenced by:  Ongoing concerns with grave disability with patient unable to perform basic feeding, hygiene and dressing activities without significant constant support.    Protective inpatient pyschiatric hospitalization required while a safe disposition plan is enacted: Yes    Patient stabilized and ready for discharge from inpatient psychiatric unit: No      STAFF:   Robert Carrillo NP  Psychiatry
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
## 2023-10-03 NOTE — PLAN OF CARE
10/03/23 1225   Step 1: Warning Signs   Warning Sign 1 getting drunk   Warning Sign 2 being angry   Warning Sign 3 talking loud   Step 2: Internal coping strategies - Things I can do to take my mind off my problems without contacting another person:   Coping Strategy 1 play playstation   Coping Strategy 2 riding bike   Coping Strategy 3 listening to music   Step 3: People and social settings that provide distraction:   1. Name Ivory Pop (mother)       Phone [phone]   2. Name Gege Pop (father)   3. Place outdoors   4. Place go for a walk    Step 4: People whom I can ask for help:   1. Person Ivory Pop       Phone [phone]   2. Person Gege Pop   3. Person n/a   Step 5: Professionals or agencies I can contact during a crisis:   1. Clinician Name [facility]       Phone [phone]   3. Suicide Prevention Lifeline: 988 Suicide Prevention Lifeline 988   4. [facility] Emergency Service       911       Emergency Services Phone Warm Line Wed-Sun [phone]   Step 6: Making the environment safer (plan for lethal means safety)   Safe Environment Plan stay away from alochol   Safe Environment Optional: What is most important to me and worth living for? my life   Safety Plan Tasks   Provided a Hard Copy to the Patient Y   Explained How to Follow the Steps Y   Discussed Facilitators and Barriers Y
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
## 2023-10-03 NOTE — PLAN OF CARE
POC reviewed and is ongoing. Pt has been calm, cooperative and medication compliant. Pt has been in the activity room for a majority of the day. Pt is interacting well with his peers and staff. Pt states he is appreciative for the help and he really needs the help to stop drinking. Pt denies SI/HI/AVH. Pt was administered Naltrexone Microspheres 380 mg IM injection, Pt tolerated well. Pt is active on the unit and participating in group therapy and watching television. Will continue to monitor pt to ensure pt's health and safety.

## 2023-10-04 LAB
ALBUMIN SERPL BCP-MCNC: 3.7 G/DL (ref 3.5–5.2)
ALP SERPL-CCNC: 60 U/L (ref 55–135)
ALT SERPL W/O P-5'-P-CCNC: 44 U/L (ref 10–44)
ANION GAP SERPL CALC-SCNC: 5 MMOL/L (ref 3–11)
AST SERPL-CCNC: 26 U/L (ref 10–40)
BILIRUB SERPL-MCNC: 1.5 MG/DL (ref 0.1–1)
BUN SERPL-MCNC: 8 MG/DL (ref 6–20)
CALCIUM SERPL-MCNC: 8.7 MG/DL (ref 8.7–10.5)
CHLORIDE SERPL-SCNC: 108 MMOL/L (ref 95–110)
CO2 SERPL-SCNC: 28 MMOL/L (ref 23–29)
CREAT SERPL-MCNC: 0.7 MG/DL (ref 0.5–1.4)
EST. GFR  (NO RACE VARIABLE): >60 ML/MIN/1.73 M^2
GLUCOSE SERPL-MCNC: 89 MG/DL (ref 70–110)
POTASSIUM SERPL-SCNC: 3.9 MMOL/L (ref 3.5–5.1)
PROT SERPL-MCNC: 6.7 G/DL (ref 6–8.4)
SODIUM SERPL-SCNC: 141 MMOL/L (ref 136–145)

## 2023-10-04 PROCEDURE — 80053 COMPREHEN METABOLIC PANEL: CPT | Performed by: PSYCHIATRY & NEUROLOGY

## 2023-10-04 PROCEDURE — 25000003 PHARM REV CODE 250: Performed by: PSYCHIATRY & NEUROLOGY

## 2023-10-04 PROCEDURE — 90833 PSYTX W PT W E/M 30 MIN: CPT | Mod: ,,, | Performed by: PSYCHIATRY & NEUROLOGY

## 2023-10-04 PROCEDURE — 90833 PR PSYCHOTHERAPY W/PATIENT W/E&M, 30 MIN (ADD ON): ICD-10-PCS | Mod: ,,, | Performed by: PSYCHIATRY & NEUROLOGY

## 2023-10-04 PROCEDURE — 11400000 HC PSYCH PRIVATE ROOM

## 2023-10-04 PROCEDURE — 99232 PR SUBSEQUENT HOSPITAL CARE,LEVL II: ICD-10-PCS | Mod: ,,, | Performed by: PSYCHIATRY & NEUROLOGY

## 2023-10-04 PROCEDURE — 36415 COLL VENOUS BLD VENIPUNCTURE: CPT | Performed by: PSYCHIATRY & NEUROLOGY

## 2023-10-04 PROCEDURE — 99232 SBSQ HOSP IP/OBS MODERATE 35: CPT | Mod: ,,, | Performed by: PSYCHIATRY & NEUROLOGY

## 2023-10-04 RX ORDER — LORAZEPAM 1 MG/1
1 TABLET ORAL 2 TIMES DAILY
Status: DISCONTINUED | OUTPATIENT
Start: 2023-10-04 | End: 2023-10-05

## 2023-10-04 RX ADMIN — HYDROXYZINE PAMOATE 50 MG: 50 CAPSULE ORAL at 08:10

## 2023-10-04 RX ADMIN — LORAZEPAM 1 MG: 1 TABLET ORAL at 08:10

## 2023-10-04 NOTE — NURSING
POC reviewed this shift and is ongoing.  Pt calm and cooperative on unit and complaint with medications.  Pt denies SI/HI/AVH.

## 2023-10-04 NOTE — PROGRESS NOTES
"PSYCHIATRY DAILY INPATIENT PROGRESS NOTE  SUBSEQUENT HOSPITAL VISIT    ENCOUNTER DATE: 10/4/2023  SITE: [facility]    DATE OF ADMISSION: 9/30/2023  8:00 AM  LENGTH OF STAY: 4 days    CHIEF COMPLAINT   Heron Pop is a 22 y.o. male, seen during daily christian rounds on the inpatient unit.  Heron Pop presented with the chief complaint of alcohol problem, "I just want rehab"         The patient was seen and examined. The chart was reviewed.     Reviewed notes from Rns and MD and labs from the last 24 hours.    The patient's case was discussed with the treatment team/care providers today including Rns and MD    Staff reports no behavioral or management issues.     The patient has been compliant with treatment.    Subjective 10/04/2023    Patient reports mood is "good," affect is appropriate. Continues to deny s/s of ETOH withdrawal, VS remain grossly WNL. Patient received Vivitrol injection yesterday, tolerated well.     Pt continues to deny interest in inpatient rehab, stating "I just miss my family and want to get home to them." He reports willingness to receive outpatient treatment/substance abuse counseling. Once more discussed risk of relapse associated with returning to previous environment, patient verbalized understanding and stated "I'm done with all that drinking."    Psychiatric ROS (observed, reported, or endorsed/denied):  Depressed mood - No  Interest/pleasure/anhedonia: No  Guilt/hopelessness/worthlessness - No  Changes in Sleep - No  Changes in Appetite - No  Changes in Concentration - No  Changes in Energy - No  PMA/R- No  Suicidal- active/passive ideations - No  Homicidal ideations: active/passive ideations - No    Hallucinations - No  Delusions - No  Disorganized behavior - No  Disorganized speech - No  Negative symptoms - No    Elevated mood - No  Decreased need for sleep - No  Grandiosity - No  Racing thoughts - No  Impulsivity - No  Irritability- No  Increased energy - No  Distractibility - " No  Increase in goal-directed activity or PMA- No    Symptoms of TASH - No  Symptoms of Panic Disorder- No  Symptoms of PTSD - No    Overall progress: Patient is showing mild improvement     Psychotherapy:  Target symptoms: alcohol abuse  Why chosen therapy is appropriate versus another modality: relevant to diagnosis, evidence based practice  Outcome monitoring methods: self-report, observation  Therapeutic intervention type: insight oriented psychotherapy, supportive psychotherapy  Topics discussed/themes: building skills sets for symptom management, symptom recognition, substance abuse  The patient's response to the intervention is accepting. The patient's progress toward treatment goals is fair.   Duration of intervention: 16 minutes.    Medical ROS  Review of Systems   Constitutional: Negative.    HENT: Negative.     Eyes: Negative.    Respiratory: Negative.     Cardiovascular: Negative.    Gastrointestinal: Negative.    Genitourinary: Negative.    Musculoskeletal: Negative.    Skin: Negative.    Neurological: Negative.    Endo/Heme/Allergies: Negative.    Psychiatric/Behavioral:          As noted       PAST MEDICAL HISTORY   Past Medical History:   Diagnosis Date    ADHD     Bipolar disorder, unspecified     Schizophrenia        PSYCHOTROPIC MEDICATIONS   Scheduled Meds:   LORazepam  1 mg Oral BID     Continuous Infusions:  PRN Meds:.acetaminophen, aluminum-magnesium hydroxide-simethicone, docusate sodium, hydrOXYzine pamoate, loperamide, melatonin, nicotine, OLANZapine, OLANZapine, sodium chloride 0.9%    EXAMINATION    VITALS   Vitals:    10/02/23 1929 10/03/23 0745 10/03/23 1929 10/04/23 0748   BP: 128/72 122/82 117/82 129/89   BP Location: Left arm Left arm Left arm Left arm   Patient Position: Sitting Sitting Sitting Sitting   Pulse: 96 86 76 82   Resp: 20 18 16 20   Temp: 99.1 °F (37.3 °C) 98.6 °F (37 °C) 98.1 °F (36.7 °C) 98.5 °F (36.9 °C)   TempSrc: Oral Oral Oral Oral   SpO2: 99% 98% 100% 100%  "  Weight:       Height:           Body mass index is 24.37 kg/m².    CONSTITUTIONAL  General Appearance: unremarkable, age appropriate    MUSCULOSKELETAL  Muscle Strength and Tone:no tremor, no tic  Abnormal Involuntary Movements: No  Gait and Station: non-ataxic    PSYCHIATRIC   Level of Consciousness: awake, alert , and oriented  Orientation: person, place, time, and situation  Grooming: Hospital garb  Psychomotor Behavior: normal, cooperative, friendly and cooperative  Speech: normal tone, normal rate, normal pitch, normal volume  Language: grossly intact  Mood: "Great"  Affect: Consistent with mood and Congruent with thought  Thought Process: linear, logical  Associations: intact   Thought Content: DENIES suicidal ideation, DENIES homicidal ideation, and no delusions  Perceptions: denies hallucinations  Memory: Able to recall past events, Remote intact, and Recent intact  Attention:Normal and Attends to interview without distraction  Fund of Knowledge: Aware of current events and Vocabulary appropriate   Estimate if Intelligence:  Below average based on work/education history, vocabulary and mental status exam  Insight: limited awareness of illness  Judgment: behavior is adequate to circumstances    DIAGNOSTIC TESTING   Laboratory Results  Recent Results (from the past 24 hour(s))   Comprehensive Metabolic Panel    Collection Time: 10/04/23  5:32 AM   Result Value Ref Range    Sodium 141 136 - 145 mmol/L    Potassium 3.9 3.5 - 5.1 mmol/L    Chloride 108 95 - 110 mmol/L    CO2 28 23 - 29 mmol/L    Glucose 89 70 - 110 mg/dL    BUN 8 6 - 20 mg/dL    Creatinine 0.7 0.5 - 1.4 mg/dL    Calcium 8.7 8.7 - 10.5 mg/dL    Total Protein 6.7 6.0 - 8.4 g/dL    Albumin 3.7 3.5 - 5.2 g/dL    Total Bilirubin 1.5 (H) 0.1 - 1.0 mg/dL    Alkaline Phosphatase 60 55 - 135 U/L    AST 26 10 - 40 U/L    ALT 44 10 - 44 U/L    eGFR >60.0 >60 mL/min/1.73 m^2    Anion Gap 5 3 - 11 mmol/L       MEDICAL DECISION MAKING        ASSESSMENT: "   EtOH Dependence      H/o ADHD  H/o OCD  H/o Bipolar     Elevated AST     There are no problems to display for this patient.           ASSESSMENT AND TREATMENT PLAN:     Medical decision making/Formulation:  Alcohol Dependence  - Start Ativan 1mg PO QID (Avoiding valium due to elevated AST) with plan to taper off as able-Decrease to BID today  - VS q4h with Ativan PRN for breakthrough Withdrawal-Continue current dose  -Initiate PO naltrexone and consider AZEVEDO prior to discharge  -Initiate Vivitrol injection/discontinue naltrexone     H/o Bipolar Disorder  - Does not screen positive for any history of manic episodes on ROS  - Collateral will aid in clarifying clinical picture.      H/o OCD  - Unable to report medications  - Collateral will aid in clarifying home treatment regimen.      Elevated AST  - mild elevation  - Will avoid hepatotoxic medications  - Re-check tomorrow    Discussed diagnosis, risks and benefits of proposed treatment vs alternative treatments vs no treatment, potential side effects of these treatments and the inherent unpredictability of treatment. The patient expresses understanding of the above and displays the capacity to agree with this treatment given said understanding. Patient also agrees that, currently, the benefits outweigh the risks and would like to pursue/continue treatment at this time.    DISCHARGE PLANNING  Expected Disposition Plan: Home or Self Care    NEED FOR CONTINUED HOSPITALIZATION  Psychiatric illness continues to pose a potential threat to life or bodily function, of self or others, thereby requiring the need for continued inpatient psychiatric hospitalization: Yes, due to: gravely disabled, as evidenced by:  Ongoing concerns with grave disability with patient unable to perform basic feeding, hygiene and dressing activities without significant constant support.    Protective inpatient pyschiatric hospitalization required while a safe disposition plan is enacted:  Yes    Patient stabilized and ready for discharge from inpatient psychiatric unit: No      STAFF:   Robert Carrillo NP  Psychiatry

## 2023-10-04 NOTE — PLAN OF CARE
POC reviewed this shift and is on going. Patient is calm, cooperative, quiet, anxious, and showing pressured speech. Denies Suicidal Ideation, Homicidal Ideation, Auditory Hallucinations, Visual Hallucinations, Tactile Hallucinations, Gustatory Hallucinations, and Delusions. Patient has been out on the floor all day watching TV. Patient has been communicating with his peers and staff. Patient participated in the group session that was conducted today. Pt continues to be medication compliant and staff will continue to monitor pt closely while on the unit.

## 2023-10-05 LAB
ALBUMIN SERPL BCP-MCNC: 4.3 G/DL (ref 3.5–5.2)
ALP SERPL-CCNC: 71 U/L (ref 55–135)
ALT SERPL W/O P-5'-P-CCNC: 47 U/L (ref 10–44)
ANION GAP SERPL CALC-SCNC: 3 MMOL/L (ref 3–11)
AST SERPL-CCNC: 20 U/L (ref 10–40)
BILIRUB SERPL-MCNC: 0.9 MG/DL (ref 0.1–1)
BUN SERPL-MCNC: 10 MG/DL (ref 6–20)
CALCIUM SERPL-MCNC: 9.6 MG/DL (ref 8.7–10.5)
CHLORIDE SERPL-SCNC: 105 MMOL/L (ref 95–110)
CO2 SERPL-SCNC: 32 MMOL/L (ref 23–29)
CREAT SERPL-MCNC: 0.9 MG/DL (ref 0.5–1.4)
EST. GFR  (NO RACE VARIABLE): >60 ML/MIN/1.73 M^2
GLUCOSE SERPL-MCNC: 96 MG/DL (ref 70–110)
POTASSIUM SERPL-SCNC: 4.5 MMOL/L (ref 3.5–5.1)
PROT SERPL-MCNC: 7.9 G/DL (ref 6–8.4)
SODIUM SERPL-SCNC: 140 MMOL/L (ref 136–145)

## 2023-10-05 PROCEDURE — 36415 COLL VENOUS BLD VENIPUNCTURE: CPT | Performed by: PSYCHIATRY & NEUROLOGY

## 2023-10-05 PROCEDURE — 25000003 PHARM REV CODE 250: Performed by: PSYCHIATRY & NEUROLOGY

## 2023-10-05 PROCEDURE — 11400000 HC PSYCH PRIVATE ROOM

## 2023-10-05 PROCEDURE — 99232 SBSQ HOSP IP/OBS MODERATE 35: CPT | Mod: ,,, | Performed by: PSYCHIATRY & NEUROLOGY

## 2023-10-05 PROCEDURE — 80053 COMPREHEN METABOLIC PANEL: CPT | Performed by: PSYCHIATRY & NEUROLOGY

## 2023-10-05 PROCEDURE — 25000003 PHARM REV CODE 250: Performed by: EMERGENCY MEDICINE

## 2023-10-05 PROCEDURE — 90833 PR PSYCHOTHERAPY W/PATIENT W/E&M, 30 MIN (ADD ON): ICD-10-PCS | Mod: ,,, | Performed by: PSYCHIATRY & NEUROLOGY

## 2023-10-05 PROCEDURE — 99232 PR SUBSEQUENT HOSPITAL CARE,LEVL II: ICD-10-PCS | Mod: ,,, | Performed by: PSYCHIATRY & NEUROLOGY

## 2023-10-05 PROCEDURE — 90833 PSYTX W PT W E/M 30 MIN: CPT | Mod: ,,, | Performed by: PSYCHIATRY & NEUROLOGY

## 2023-10-05 RX ORDER — LORAZEPAM 1 MG/1
1 TABLET ORAL DAILY
Status: DISCONTINUED | OUTPATIENT
Start: 2023-10-06 | End: 2023-10-06

## 2023-10-05 RX ADMIN — LORAZEPAM 1 MG: 1 TABLET ORAL at 08:10

## 2023-10-05 RX ADMIN — Medication 6 MG: at 08:10

## 2023-10-05 RX ADMIN — HYDROXYZINE PAMOATE 50 MG: 50 CAPSULE ORAL at 08:10

## 2023-10-05 NOTE — PLAN OF CARE
POC reviewed this shift and is on going. Patient is calm, cooperative, quiet, anxious, and sleeping. Denies Suicidal Ideation, Homicidal Ideation, Auditory Hallucinations, Visual Hallucinations, Tactile Hallucinations, Gustatory Hallucinations, and Delusions. Patient slept in most of the morning. Patient was out for meals and snacks. Patient participated in the group session that was conducted today. Patient is a good candidate for discharge tomorrow. Pt continues to be medication compliant and staff will continue to monitor pt closely while on the unit.

## 2023-10-05 NOTE — PLAN OF CARE
POC reviewed this shift and is ongoing.  Pt withdrawn to self with quiet, soft speech. Pt cooperative with staff and staff reports no negative behavior. Compliant with meds with no adverse reactions. Took snack ate 100%. Will continue to monitor for changes and safety.

## 2023-10-05 NOTE — PROGRESS NOTES
"PSYCHIATRY DAILY INPATIENT PROGRESS NOTE  SUBSEQUENT HOSPITAL VISIT    ENCOUNTER DATE: 10/5/2023  SITE: [facility]    DATE OF ADMISSION: 9/30/2023  8:00 AM  LENGTH OF STAY: 5 days    CHIEF COMPLAINT   Heron Pop is a 22 y.o. male, seen during daily christian rounds on the inpatient unit.  Heron Pop presented with the chief complaint of alcohol problem, "I just want rehab"         The patient was seen and examined. The chart was reviewed.     Reviewed notes from Rns and MD and labs from the last 24 hours.    The patient's case was discussed with the treatment team/care providers today including Rns and MD    Staff reports no behavioral or management issues.     The patient has been compliant with treatment.    Subjective 10/05/2023    Patient reports mood is "good but bored," affect is appropriate.  Continues to deny s/s of ETOH withdrawal and is tolerating ativan taper well.Continues to deny interest in inpatient rehab, stating "I just miss my family."    Discussed potential triggers for relapse. Patient states his primary reasons for drinking include "I like the taste of beer" and "Sometimes I drink beer when I'm bored." Patient was able to verbalize healthier replacement habits, including "Taking a walk to the store, riding my bike, playing video games."    No SE reported following administration of Vivitrol.     Will recheck CMP, likely candidate for discharge tomorrow.     Psychiatric ROS (observed, reported, or endorsed/denied):  Depressed mood - No  Interest/pleasure/anhedonia: No  Guilt/hopelessness/worthlessness - No  Changes in Sleep - No  Changes in Appetite - No  Changes in Concentration - No  Changes in Energy - No  PMA/R- No  Suicidal- active/passive ideations - No  Homicidal ideations: active/passive ideations - No    Hallucinations - No  Delusions - No  Disorganized behavior - No  Disorganized speech - No  Negative symptoms - No    Elevated mood - No  Decreased need for sleep - No  Grandiosity " - No  Racing thoughts - No  Impulsivity - No  Irritability- No  Increased energy - No  Distractibility - No  Increase in goal-directed activity or PMA- No    Symptoms of TASH - No  Symptoms of Panic Disorder- No  Symptoms of PTSD - No    Overall progress: Patient is showing mild improvement     Psychotherapy:  Target symptoms: alcohol abuse  Why chosen therapy is appropriate versus another modality: relevant to diagnosis, evidence based practice  Outcome monitoring methods: self-report, observation  Therapeutic intervention type: insight oriented psychotherapy, supportive psychotherapy  Topics discussed/themes: building skills sets for symptom management, symptom recognition, substance abuse  The patient's response to the intervention is accepting. The patient's progress toward treatment goals is fair.   Duration of intervention: 16 minutes.    Medical ROS  Review of Systems   Constitutional: Negative.    HENT: Negative.     Eyes: Negative.    Respiratory: Negative.     Cardiovascular: Negative.    Gastrointestinal: Negative.    Genitourinary: Negative.    Musculoskeletal: Negative.    Skin: Negative.    Neurological: Negative.    Endo/Heme/Allergies: Negative.    Psychiatric/Behavioral:          As noted       PAST MEDICAL HISTORY   Past Medical History:   Diagnosis Date    ADHD     Bipolar disorder, unspecified     Schizophrenia        PSYCHOTROPIC MEDICATIONS   Scheduled Meds:   [START ON 10/6/2023] LORazepam  1 mg Oral Daily     Continuous Infusions:  PRN Meds:.acetaminophen, aluminum-magnesium hydroxide-simethicone, docusate sodium, hydrOXYzine pamoate, loperamide, melatonin, nicotine, OLANZapine, OLANZapine, sodium chloride 0.9%    EXAMINATION    VITALS   Vitals:    10/03/23 1929 10/04/23 0748 10/04/23 1911 10/05/23 0732   BP: 117/82 129/89 117/81 115/84   BP Location: Left arm Left arm Left arm Left arm   Patient Position: Sitting Sitting Sitting Sitting   Pulse: 76 82 74 99   Resp: 16 20 18 20   Temp: 98.1 °F  "(36.7 °C) 98.5 °F (36.9 °C) 98.1 °F (36.7 °C) 97.8 °F (36.6 °C)   TempSrc: Oral Oral Oral Oral   SpO2: 100% 100% 99% 98%   Weight:       Height:           Body mass index is 24.37 kg/m².    CONSTITUTIONAL  General Appearance: unremarkable, age appropriate    MUSCULOSKELETAL  Muscle Strength and Tone:no tremor, no tic  Abnormal Involuntary Movements: No  Gait and Station: non-ataxic    PSYCHIATRIC   Level of Consciousness: awake, alert , and oriented  Orientation: person, place, time, and situation  Grooming: Hospital garb  Psychomotor Behavior: normal, cooperative, friendly and cooperative  Speech: normal tone, normal rate, normal pitch, normal volume  Language: grossly intact  Mood: "Good, bored"  Affect: Consistent with mood and Congruent with thought  Thought Process: linear, logical  Associations: intact   Thought Content: DENIES suicidal ideation, DENIES homicidal ideation, and no delusions  Perceptions: denies hallucinations  Memory: Able to recall past events, Remote intact, and Recent intact  Attention:Normal and Attends to interview without distraction  Fund of Knowledge: Aware of current events and Vocabulary appropriate   Estimate if Intelligence:  Below average based on work/education history, vocabulary and mental status exam  Insight: limited awareness of illness  Judgment: behavior is adequate to circumstances    DIAGNOSTIC TESTING   Laboratory Results  No results found for this or any previous visit (from the past 24 hour(s)).      MEDICAL DECISION MAKING        ASSESSMENT:   EtOH Dependence      H/o ADHD  H/o OCD  H/o Bipolar     Elevated AST     There are no problems to display for this patient.           ASSESSMENT AND TREATMENT PLAN:     Medical decision making/Formulation:  Alcohol Dependence  - Start Ativan 1mg PO QID (Avoiding valium due to elevated AST) with plan to taper off as able-Decrease to1 mg daily    -Initiate PO naltrexone and consider AZEVEDO prior to discharge  -Initiate Vivitrol " injection/discontinue naltrexone     H/o Bipolar Disorder  - Does not screen positive for any history of manic episodes on ROS  - Collateral will aid in clarifying clinical picture.      H/o OCD  - Unable to report medications  - Collateral will aid in clarifying home treatment regimen.      Elevated AST  - mild elevation  - Will avoid hepatotoxic medications  - Re-check today    Discussed diagnosis, risks and benefits of proposed treatment vs alternative treatments vs no treatment, potential side effects of these treatments and the inherent unpredictability of treatment. The patient expresses understanding of the above and displays the capacity to agree with this treatment given said understanding. Patient also agrees that, currently, the benefits outweigh the risks and would like to pursue/continue treatment at this time.    DISCHARGE PLANNING  Expected Disposition Plan: Home or Self Care    NEED FOR CONTINUED HOSPITALIZATION  Psychiatric illness continues to pose a potential threat to life or bodily function, of self or others, thereby requiring the need for continued inpatient psychiatric hospitalization: Yes, due to: gravely disabled, as evidenced by:  Ongoing concerns with grave disability with patient unable to perform basic feeding, hygiene and dressing activities without significant constant support.    Protective inpatient pyschiatric hospitalization required while a safe disposition plan is enacted: Yes    Patient stabilized and ready for discharge from inpatient psychiatric unit: No      STAFF:   Robert Carrillo NP  Psychiatry

## 2023-10-05 NOTE — PLAN OF CARE
"  Problem: Adult Behavioral Health Plan of Care  Goal: Optimized Coping Skills in Response to Life Stressors  Intervention: Promote Effective Coping Strategies  Flowsheets (Taken 10/4/2023 2699)  Supportive Measures:   active listening utilized   problem-solving facilitated   self-reflection promoted   verbalization of feelings encouraged       Behavior: Pt was withdrawn and sat at the back of the room away from other patients. pt was hyper verbal but appeared agitated.             Intervention: In this CBT-focused group CSW facilitated group discussion on coping strategies. Each patient was asked to identify unhealthy coping strategies and healthy coping strategies.           Response: pt identified unhealthy coping strategy as drinking. Pt stated I drink because I'm stressed I'm 22 going on 23 and never had a girlfriend. Im not jealous I'm mad have you ever heard trisha bar "what about me"           Plan: Continue to encourage pt to participate in process groups to verbalize feelings and develop healthy coping skills.                    "

## 2023-10-05 NOTE — PLAN OF CARE
Problem: Adult Behavioral Health Plan of Care  Goal: Optimized Coping Skills in Response to Life Stressors  Intervention: Promote Effective Coping Strategies  10/5/2023 1539 by Hayley Chu SSW   Flowsheets (Taken 10/5/2023 1245)  Supportive Measures:   active listening utilized   goal-setting facilitated   verbalization of feelings encouraged       Behavior: Pt was engaged during group       Intervention:   In this CBT-focused group, patients discussed the importance of setting goals and each patient was asked to identify specific goals to aid in their future treatment.         Response: pt identified learning more about chakras mediating, listening to calming music, find productive ways to relieve stress while in school as goal to aid in future treatment.       Plan: Continue to encourage pt to participate in process groups to verbalize feelings and develop healthy coping skills.

## 2023-10-06 VITALS
HEIGHT: 64 IN | DIASTOLIC BLOOD PRESSURE: 71 MMHG | OXYGEN SATURATION: 97 % | RESPIRATION RATE: 18 BRPM | TEMPERATURE: 99 F | BODY MASS INDEX: 24.24 KG/M2 | HEART RATE: 85 BPM | SYSTOLIC BLOOD PRESSURE: 119 MMHG | WEIGHT: 142 LBS

## 2023-10-06 PROBLEM — F10.920 ALCOHOLIC INTOXICATION WITHOUT COMPLICATION: Status: ACTIVE | Noted: 2023-10-06

## 2023-10-06 PROCEDURE — 90833 PR PSYCHOTHERAPY W/PATIENT W/E&M, 30 MIN (ADD ON): ICD-10-PCS | Mod: ,,, | Performed by: PSYCHIATRY & NEUROLOGY

## 2023-10-06 PROCEDURE — 90833 PSYTX W PT W E/M 30 MIN: CPT | Mod: ,,, | Performed by: PSYCHIATRY & NEUROLOGY

## 2023-10-06 PROCEDURE — 25000003 PHARM REV CODE 250: Performed by: PSYCHIATRY & NEUROLOGY

## 2023-10-06 PROCEDURE — 99239 HOSP IP/OBS DSCHRG MGMT >30: CPT | Mod: ,,, | Performed by: PSYCHIATRY & NEUROLOGY

## 2023-10-06 PROCEDURE — 99239 PR HOSPITAL DISCHARGE DAY,>30 MIN: ICD-10-PCS | Mod: ,,, | Performed by: PSYCHIATRY & NEUROLOGY

## 2023-10-06 RX ADMIN — LORAZEPAM 1 MG: 1 TABLET ORAL at 08:10

## 2023-10-06 NOTE — PROGRESS NOTES
Psychotherapy:  Target symptoms: alcohol abuse  Why chosen therapy is appropriate versus another modality: relevant to diagnosis, evidence based practice  Outcome monitoring methods: self-report, observation  Therapeutic intervention type: insight oriented psychotherapy, supportive psychotherapy  Topics discussed/themes: building skills sets for symptom management, symptom recognition, substance abuse  -safety planning and wrap up session  The patient's response to the intervention is accepting. The patient's progress toward treatment goals is fair.   Duration of intervention: 16 minutes.  David Herron MD  Psychiatry

## 2023-10-06 NOTE — NURSING
AVS reviewed with pt. Pt agreed to adhere to all follow up instructions and appointments. Pt denies SI/HI/AVH. Pt escorted off unit to private transportation without incident.

## 2023-10-06 NOTE — PLAN OF CARE
PSA: completed    Collateral: completed     Safety Plan: completed     Anticipated Discharge Date: 10/6/23    Anticipated Discharge Plan: Home with family, JAIME

## 2023-10-06 NOTE — PLAN OF CARE
POC review this shift and is ongoing.  Pt cooperative with staff and limited interaction with peers. No ss of distress. Med compliant.

## 2023-10-06 NOTE — PLAN OF CARE
CSW called and gave patient's motherIvory the number to [facility] , [phone] to see what services patient may qualify for regarding his developmental disability.

## 2023-10-06 NOTE — DISCHARGE SUMMARY
"Discharge Summary  Psychiatry    Admit Date: 9/30/2023    Discharge Date and Time:  10/06/2023 8:51 AM    Attending Physician: Amarjit Cheng MD     Discharge Provider: David Herron MD    Reason for Admission:   "I just want rehab"     History of Present Illness:   Per ER Note:       Chief Complaint   Patient presents with    Alcohol Problem       Patient brought in by [facility]. Patient states "I just want rehab" denies SI/HI      Patient with history of bipolar schizophrenia presents via police after a domestic argument with his mother requesting that he be brought to the emergency department.  The patient admits he is not been taking his medication as want any inpatient help.  He is tearful but not being argumentative at this time.  He also states that he has been drinking alcohol.  Denies any suicidal or homicidal ideation.      Per Initial Interview:  Heron Pop is a 22 y.o. male with past psychiatric history of EtOH abuse.      Pt reports that "I been drinking you know, drinking a little beer you know. Im gonna try to quit drinking and get on my medicine." States that he normally takes clonidine "to put me to sleep." He states he thinks he used to take medicine for depression and anxiety but states he cant remember. States he used to take medication for ADHD, as well as depakote. Does not want to take depakote anymore because it makes him swell. States he has been diagnosed as "ADHD, bipolar, and autism." In the past, but he does not think he still has these issue because he is now 22 years old. Pt states he is bored here because he misses his youtube, playstation and wifi.      Pt reports he drinks around 12 beers daily since he turned 19. He is not able to state whether he has had alcohol withdrawal symptoms in the past. He is interested in going to rehab.      Pt currently denies depression and anxiety. Denies MARI and AVH. "The doctor told me I got schizophrenic or something but I don't know " "because I don't see things or hear things."        Psych ROS:   Denies any consistent depression symptoms over the past 2 weeks including decreased interest/motivation/pleasure/energy/appetite/concentration/sleep.     Denies any history of manic symptoms, including decreased need for sleep, increased energy, increased goal oriented behavior, risky behavior, racing thoughts.      Denies any history of psychotic symptoms, including AVH, paranoia, thought insertion/broadcasting/withdrawal, delusions.      Denies TASH symptoms including excess worry, tension, insomnia. Denies panic attacks.      Denies history of PTSD symptoms including flashbacks, nightmares, hypervigilance.     Endorses OCD and denies eating disorder symptoms.    Procedures Performed: * No surgery found *    Hospital Course:    Patient was admitted to the inpatient psychiatry unit after being medically cleared in the ED. Chart and labs were reviewed. The patient was stabilized as follows:      Alcohol Dependence  - Start Ativan 1mg PO QID (Avoiding valium due to elevated AST) with plan to taper off as able-Decrease to1 mg daily then stop; taper completed without incident     -Initiate PO naltrexone and consider AZEVEDO prior to discharge  -Initiate Vivitrol injection/discontinue naltrexone- second dose due around 11/5 and to be given as an outpatient      Alcohol Brief Intervention (approximately 15 minutes)    Discussed with patient that there is concern he/she is drinking at unhealthy levels known to increase his/her risk of alcohol-related health problems - Yes  Discussed general feedback on health risks associated with drinking and/or given personalized feedback - Yes  Patient was advised to abstain from alcohol use - Yes          H/o Bipolar Disorder  - Does not screen positive for any history of manic episodes on ROS  - Collateral will aid in clarifying clinical picture- unrevealing     H/o OCD  - Unable to report medications  - Collateral will aid " in clarifying home treatment regimen.      Elevated AST  - mild elevation  - Will avoid hepatotoxic medications  - Re-check today- Improving            During hospitalization, the patient was encouraged to go to both groups and individual counseling. Patient was monitored for any side effects. A meeting was held with multidisciplinary team prior to discharge and pt's diagnosis, current medications, and follow up were discussed. The patient has been compliant with treatment and can adequately attend to activities of daily living in an independent manner. The patient denies any side effects. The patient denies SI, HI, plan or intent for self harm or harm to others. The patient is no longer a danger to self or others nor gravely disabled disabled. Patient discharged  in stable condition with scheduled outpatient follow up.    He denied withdrawals or cravings; CIWA was 0; he declined rehab    The patient reports improved symptoms as documented below. The patient is requesting discharge. The patient is currently stable for discharge home and is able/willing to attend outpatient care. The patient is hopeful, future oriented and goal directed. The patient readily discusses both short and long term goals. The patient can identify positive coping skills and social support.     Psychiatric ROS (observed, reported, or endorsed/denied):  Depressed mood - No  Interest/pleasure/anhedonia: No  Guilt/hopelessness/worthlessness - No  Changes in Sleep - No  Changes in Appetite - No  Changes in Concentration - No  Changes in Energy - No  PMA/R- No  Suicidal- active/passive ideations - No  Homicidal ideations: active/passive ideations - No     Hallucinations - No  Delusions - No  Disorganized behavior - No  Disorganized speech - No  Negative symptoms - No     Elevated mood - No  Decreased need for sleep - No  Grandiosity - No  Racing thoughts - No  Impulsivity - No  Irritability- No  Increased energy - No  Distractibility -  No  Increase in goal-directed activity or PMA- No     Symptoms of TASH - No  Symptoms of Panic Disorder- No  Symptoms of PTSD - No      Discussed diagnosis, risks and benefits of proposed treatment vs alternative treatments vs no treatment, and potential side effects of these treatments.  The patient expresses understanding of the above and displays the capacity to agree with this treatment given said understanding.  Patient also agrees that, currently, the benefits outweigh the risks and would like to pursue treatment at this time.      Discharge MSE: stated age, casually dressed, well groomed.  No psychomotor agitation or retardation.  No abnormal involuntary movements.  Gait normal.  Speech normal, conversational.  Language fluent English. Mood fine.  Affect normal range, pleasant, euthymic.  Thought process linear.  Associations intact.  Denies suicidal or homicidal ideation.  Denies auditory hallucinations, paranoid ideation, ideas of reference.  Memory intact.  Attention intact.  Fund of knowledge intact.  Insight intact.  Judgment intact.  Alert and oriented to person, place, time.      Tobacco Usage:  Is patient a smoker? No  Does patient want prescription for Tobacco Cessation? No  Does patient want counseling for Tobacco Cessation? No    If patient would like to quit, then over the counter nicotine patch could be used. The patient could also follow up with his PCP or psychiatric provider for other alternatives.         Final Diagnoses:    Principal Problem: Alcohol withdrawal with unspecified complication   Secondary Diagnoses:   Alcohol Dependence, severe, continuous with physiological dependence       H/o ADHD  H/o OCD  H/o Bipolar     Elevated AST    Labs:  Admission on 09/30/2023   Component Date Value Ref Range Status    Alcohol, Serum 09/30/2023 343 (HH)  <10 mg/dL Final    WBC 09/30/2023 7.05  3.90 - 12.70 K/uL Final    RBC 09/30/2023 5.23  4.60 - 6.20 M/uL Final    Hemoglobin 09/30/2023 16.0  14.0 -  18.0 g/dL Final    Hematocrit 09/30/2023 46.6  40.0 - 54.0 % Final    MCV 09/30/2023 89  82 - 98 fL Final    MCH 09/30/2023 30.6  27.0 - 31.0 pg Final    MCHC 09/30/2023 34.3  32.0 - 36.0 g/dL Final    RDW 09/30/2023 12.0  11.5 - 14.5 % Final    Platelets 09/30/2023 242  150 - 450 K/uL Final    MPV 09/30/2023 9.2  9.2 - 12.9 fL Final    Immature Granulocytes 09/30/2023 0.3  0.0 - 0.5 % Final    Gran # (ANC) 09/30/2023 4.2  1.8 - 7.7 K/uL Final    Immature Grans (Abs) 09/30/2023 0.02  0.00 - 0.04 K/uL Final    Lymph # 09/30/2023 2.0  1.0 - 4.8 K/uL Final    Mono # 09/30/2023 0.7  0.3 - 1.0 K/uL Final    Eos # 09/30/2023 0.2  0.0 - 0.5 K/uL Final    Baso # 09/30/2023 0.05  0.00 - 0.20 K/uL Final    nRBC 09/30/2023 0  0 /100 WBC Final    Gran % 09/30/2023 59.7  38.0 - 73.0 % Final    Lymph % 09/30/2023 27.7  18.0 - 48.0 % Final    Mono % 09/30/2023 9.5  4.0 - 15.0 % Final    Eosinophil % 09/30/2023 2.1  0.0 - 8.0 % Final    Basophil % 09/30/2023 0.7  0.0 - 1.9 % Final    Differential Method 09/30/2023 Automated   Final    Sodium 09/30/2023 145  136 - 145 mmol/L Final    Potassium 09/30/2023 3.8  3.5 - 5.1 mmol/L Final    Chloride 09/30/2023 112 (H)  95 - 110 mmol/L Final    CO2 09/30/2023 28  23 - 29 mmol/L Final    Glucose 09/30/2023 130 (H)  70 - 110 mg/dL Final    BUN 09/30/2023 7  6 - 20 mg/dL Final    Creatinine 09/30/2023 0.9  0.5 - 1.4 mg/dL Final    Calcium 09/30/2023 8.6 (L)  8.7 - 10.5 mg/dL Final    Total Protein 09/30/2023 8.3  6.0 - 8.4 g/dL Final    Albumin 09/30/2023 4.7  3.5 - 5.2 g/dL Final    Total Bilirubin 09/30/2023 0.7  0.1 - 1.0 mg/dL Final    Alkaline Phosphatase 09/30/2023 80  55 - 135 U/L Final    AST 09/30/2023 75 (H)  10 - 40 U/L Final    ALT 09/30/2023 41  10 - 44 U/L Final    eGFR 09/30/2023 >60.0  >60 mL/min/1.73 m^2 Final    Anion Gap 09/30/2023 5  3 - 11 mmol/L Final    Magnesium 09/30/2023 2.3  1.6 - 2.6 mg/dL Final    TSH 09/30/2023 1.420  0.400 - 4.000 uIU/mL Final    Salicylate Lvl  09/30/2023 <1.7 (L)  15.0 - 30.0 mg/dL Final    Acetaminophen (Tylenol), Serum 09/30/2023 <2.0 (L)  10.0 - 20.0 ug/mL Final    Benzodiazepines 09/30/2023 Negative  Negative Final    Methadone metabolites 09/30/2023 Negative  Negative Final    Cocaine (Metab.) 09/30/2023 Negative  Negative Final    Opiate Scrn, Ur 09/30/2023 Negative  Negative Final    Barbiturate Screen, Ur 09/30/2023 Negative  Negative Final    Amphetamine Screen, Ur 09/30/2023 Negative  Negative Final    THC 09/30/2023 Negative  Negative Final    Phencyclidine 09/30/2023 Negative  Negative Final    Creatinine, Urine 09/30/2023 33.8  23.0 - 375.0 mg/dL Final    Toxicology Information 09/30/2023 SEE COMMENT   Final    Alcohol, Serum 09/30/2023 267 (H)  <10 mg/dL Final    Alcohol, Serum 09/30/2023 177 (H)  <10 mg/dL Final    Sodium 10/04/2023 141  136 - 145 mmol/L Final    Potassium 10/04/2023 3.9  3.5 - 5.1 mmol/L Final    Chloride 10/04/2023 108  95 - 110 mmol/L Final    CO2 10/04/2023 28  23 - 29 mmol/L Final    Glucose 10/04/2023 89  70 - 110 mg/dL Final    BUN 10/04/2023 8  6 - 20 mg/dL Final    Creatinine 10/04/2023 0.7  0.5 - 1.4 mg/dL Final    Calcium 10/04/2023 8.7  8.7 - 10.5 mg/dL Final    Total Protein 10/04/2023 6.7  6.0 - 8.4 g/dL Final    Albumin 10/04/2023 3.7  3.5 - 5.2 g/dL Final    Total Bilirubin 10/04/2023 1.5 (H)  0.1 - 1.0 mg/dL Final    Alkaline Phosphatase 10/04/2023 60  55 - 135 U/L Final    AST 10/04/2023 26  10 - 40 U/L Final    ALT 10/04/2023 44  10 - 44 U/L Final    eGFR 10/04/2023 >60.0  >60 mL/min/1.73 m^2 Final    Anion Gap 10/04/2023 5  3 - 11 mmol/L Final    Sodium 10/05/2023 140  136 - 145 mmol/L Final    Potassium 10/05/2023 4.5  3.5 - 5.1 mmol/L Final    Chloride 10/05/2023 105  95 - 110 mmol/L Final    CO2 10/05/2023 32 (H)  23 - 29 mmol/L Final    Glucose 10/05/2023 96  70 - 110 mg/dL Final    BUN 10/05/2023 10  6 - 20 mg/dL Final    Creatinine 10/05/2023 0.9  0.5 - 1.4 mg/dL Final    Calcium 10/05/2023 9.6   8.7 - 10.5 mg/dL Final    Total Protein 10/05/2023 7.9  6.0 - 8.4 g/dL Final    Albumin 10/05/2023 4.3  3.5 - 5.2 g/dL Final    Total Bilirubin 10/05/2023 0.9  0.1 - 1.0 mg/dL Final    Alkaline Phosphatase 10/05/2023 71  55 - 135 U/L Final    AST 10/05/2023 20  10 - 40 U/L Final    ALT 10/05/2023 47 (H)  10 - 44 U/L Final    eGFR 10/05/2023 >60.0  >60 mL/min/1.73 m^2 Final    Anion Gap 10/05/2023 3  3 - 11 mmol/L Final         Discharged Condition: stable and improved; not currently a danger to self/others or gravely disabled    Disposition: Home or Self Care    Is patient being discharged on multiple neuroleptics? No    Follow Up/Patient Instructions:     Take all medications as prescribed.  Attend all psychiatric and medical follow up appointments.   Abstain from all drugs and alcohol.  Call the crisis line at: [phone] for help in a crisis and emergent situations or call 911 and Return to ED for any acute worsening of your condition including suicidal or homicidal ideations      No discharge procedures on file.        [place] [facility] [place]      Medications:  Reconciled Home Medications:      Medication List      You have not been prescribed any medications.           Diet: regular     Activity as tolerated    Total time spent discharging patient: 35 minutes    David Herron MD  Psychiatry